# Patient Record
Sex: FEMALE | Race: WHITE | NOT HISPANIC OR LATINO | Employment: OTHER | URBAN - METROPOLITAN AREA
[De-identification: names, ages, dates, MRNs, and addresses within clinical notes are randomized per-mention and may not be internally consistent; named-entity substitution may affect disease eponyms.]

---

## 2021-07-01 ENCOUNTER — PATIENT MESSAGE (OUTPATIENT)
Dept: ADMINISTRATIVE | Facility: OTHER | Age: 36
End: 2021-07-01

## 2022-03-19 ENCOUNTER — TELEPHONE (OUTPATIENT)
Dept: GASTROENTEROLOGY | Facility: CLINIC | Age: 37
End: 2022-03-19
Payer: COMMERCIAL

## 2022-03-21 NOTE — TELEPHONE ENCOUNTER
Spoke with patient  She wants a 2nd opinion, but just started seeing a GI so she wants to wait  Advised we would keep her chart in our inactive files for 90 days if she changes her mind and wants an appointment  Pt verbalized understanding to all and has no further questions at this time.

## 2022-05-18 ENCOUNTER — TELEPHONE (OUTPATIENT)
Dept: GASTROENTEROLOGY | Facility: CLINIC | Age: 37
End: 2022-05-18
Payer: COMMERCIAL

## 2022-06-13 ENCOUNTER — TELEPHONE (OUTPATIENT)
Dept: GASTROENTEROLOGY | Facility: CLINIC | Age: 37
End: 2022-06-13
Payer: COMMERCIAL

## 2022-06-16 ENCOUNTER — PATIENT MESSAGE (OUTPATIENT)
Dept: GASTROENTEROLOGY | Facility: CLINIC | Age: 37
End: 2022-06-16
Payer: COMMERCIAL

## 2022-06-17 ENCOUNTER — PATIENT MESSAGE (OUTPATIENT)
Dept: GASTROENTEROLOGY | Facility: CLINIC | Age: 37
End: 2022-06-17
Payer: COMMERCIAL

## 2022-06-17 ENCOUNTER — TELEPHONE (OUTPATIENT)
Dept: GASTROENTEROLOGY | Facility: CLINIC | Age: 37
End: 2022-06-17
Payer: COMMERCIAL

## 2022-08-15 PROCEDURE — 99358 PROLONG SERVICE W/O CONTACT: CPT | Mod: S$GLB,,, | Performed by: INTERNAL MEDICINE

## 2022-08-15 PROCEDURE — 99358 PR PROLONGED SERV,NO CONTACT,1ST HR: ICD-10-PCS | Mod: S$GLB,,, | Performed by: INTERNAL MEDICINE

## 2022-08-16 ENCOUNTER — OFFICE VISIT (OUTPATIENT)
Dept: GASTROENTEROLOGY | Facility: CLINIC | Age: 37
End: 2022-08-16
Payer: COMMERCIAL

## 2022-08-16 VITALS
TEMPERATURE: 98 F | WEIGHT: 114.69 LBS | OXYGEN SATURATION: 100 % | SYSTOLIC BLOOD PRESSURE: 114 MMHG | DIASTOLIC BLOOD PRESSURE: 75 MMHG | BODY MASS INDEX: 21.65 KG/M2 | HEIGHT: 61 IN | HEART RATE: 80 BPM

## 2022-08-16 DIAGNOSIS — D84.9 IMMUNOSUPPRESSED STATUS: ICD-10-CM

## 2022-08-16 DIAGNOSIS — K51.218 ULCERATIVE PROCTITIS WITH OTHER COMPLICATION: Primary | ICD-10-CM

## 2022-08-16 PROCEDURE — 3078F DIAST BP <80 MM HG: CPT | Mod: CPTII,S$GLB,, | Performed by: INTERNAL MEDICINE

## 2022-08-16 PROCEDURE — 3074F PR MOST RECENT SYSTOLIC BLOOD PRESSURE < 130 MM HG: ICD-10-PCS | Mod: CPTII,S$GLB,, | Performed by: INTERNAL MEDICINE

## 2022-08-16 PROCEDURE — 3008F BODY MASS INDEX DOCD: CPT | Mod: CPTII,S$GLB,, | Performed by: INTERNAL MEDICINE

## 2022-08-16 PROCEDURE — 3078F PR MOST RECENT DIASTOLIC BLOOD PRESSURE < 80 MM HG: ICD-10-PCS | Mod: CPTII,S$GLB,, | Performed by: INTERNAL MEDICINE

## 2022-08-16 PROCEDURE — 99214 OFFICE O/P EST MOD 30 MIN: CPT | Mod: S$GLB,,, | Performed by: INTERNAL MEDICINE

## 2022-08-16 PROCEDURE — 3008F PR BODY MASS INDEX (BMI) DOCUMENTED: ICD-10-PCS | Mod: CPTII,S$GLB,, | Performed by: INTERNAL MEDICINE

## 2022-08-16 PROCEDURE — 99214 PR OFFICE/OUTPT VISIT, EST, LEVL IV, 30-39 MIN: ICD-10-PCS | Mod: S$GLB,,, | Performed by: INTERNAL MEDICINE

## 2022-08-16 PROCEDURE — 3074F SYST BP LT 130 MM HG: CPT | Mod: CPTII,S$GLB,, | Performed by: INTERNAL MEDICINE

## 2022-08-16 RX ORDER — LOPERAMIDE HCL 2 MG
2 TABLET ORAL 4 TIMES DAILY PRN
COMMUNITY
Start: 2022-05-17 | End: 2022-12-14

## 2022-08-16 RX ORDER — MESALAMINE 1000 MG/1
1000 SUPPOSITORY RECTAL 2 TIMES DAILY
COMMUNITY
Start: 2022-03-30 | End: 2022-08-20 | Stop reason: ALTCHOICE

## 2022-08-16 RX ORDER — USTEKINUMAB 90 MG/ML
90 INJECTION, SOLUTION SUBCUTANEOUS
COMMUNITY
Start: 2022-03-22 | End: 2022-11-10 | Stop reason: DRUGHIGH

## 2022-08-16 RX ORDER — MULTIVITAMIN
1 TABLET ORAL DAILY
COMMUNITY
End: 2022-12-14 | Stop reason: SDUPTHER

## 2022-08-16 RX ORDER — MESALAMINE 1.2 G/1
4.8 TABLET, DELAYED RELEASE ORAL
COMMUNITY
Start: 2022-07-01 | End: 2023-03-20 | Stop reason: SDUPTHER

## 2022-08-16 RX ORDER — LAMOTRIGINE 150 MG/1
150 TABLET ORAL DAILY
COMMUNITY
Start: 2022-01-18

## 2022-08-16 NOTE — PROGRESS NOTES
"IBD PATIENT INTAKE:    COVID symptoms in the last 7 days (runny nose, sore throat, congestion, cough, fever): yes, + covid 1st week of July   PCP: TESSA MCCOY  If not PCP-  number given to establish [phone]: N/A    ALLERGIES REVIEWED:  Yes    CHIEF COMPLAINT:    Chief Complaint   Patient presents with    Ulcerative Colitis       VITAL SIGNS:  /75 (BP Location: Left arm, Patient Position: Sitting)   Pulse 80   Temp 98.1 °F (36.7 °C)   Ht 5' 1" (1.549 m)   Wt 52 kg (114 lb 11.2 oz)   SpO2 100%   BMI 21.67 kg/m²      Change in medical, surgical, family or social history: No    IBD THERAPY (name, dose/frequency):  Stelara Q 8 weeks, lialda 4.8 g daily and canasa supp BID (pt do not take supp daily. Last took on Sat 8/13/22 1 supp daily)  Last dose:  7/12/22    Next dose:  9/6/22  Infusion/Pharmacy: [facility]    NSAIDs (aspirin, ibuprofen-advil or motrin, naproxen-aleve, diclofenac-voltaren, BC powder, excedrin, goodies): No    Alternative/Complementary Medications (i.e. probiotics, turmeric, fish oil, aloe vera):      no  Name/dose:  None     Vitamins:   Vit D:  no     Vit B-12:  no   Folic Acid: no       Calcium: no     Iron:  no      MVI: yes, daily     Antibiotics (past 30 Days):  no  If yes   Indication:  Name of antibiotic:  Completion date:     REVIEWED MEDICATION LIST RECONCILED INCLUDING ABOVE MEDS:  yes                      Answers for HPI/ROS submitted by the patient on 8/10/2022  cough: Yes  Feeling depressed?: Yes  nervous/ anxious: Yes  back pain: Yes      "

## 2022-08-16 NOTE — PROGRESS NOTES
[facility]          Inflammatory Bowel Disease          New Patient Note         TODAY'S VISIT DATE:  8/20/2022    Reason for Consult:    Chief Complaint   Patient presents with    Ulcerative Colitis     PCP: TESSA MCCOY      Referring MD:   Rosalinda Ott MD  [address] [place] [id number]     History of Present Illness:    Dear Dr. Ott,     Thank you for referring Ms Jewell to the Inflammatory Bowel Disease clinic at [facility] for her ulcerative colitis. I had the pleasure of seeing her on 8/16/22   As you know, her past medical history includes IBS, family history of celiac disease. In 2000 she had constipation and underwent barium enema and after this rectal bleeding started.  In approximately 2001 she had a colonoscopy in [facility] and she was diagnosed with ulcerative proctitis and treated with proctofoam 1-2 mos and this helped. From 1333-2024 she had intermittent constipation and blood in stool and treated this with miralax. In 12/2004 she started with rectal bleeding and colonoscopy showed distal 10 cm of rectum with granularity and friability and multiple erosions with remainder of colon and TI normal.  She was diagnosed with ulcerative proctitis and treated with asacol 400 mg BID which was optimized to 6.4 g/d but the number of pills/frequency was difficult at her age and she was only taking about 1/2 the dose recommended. She moved to [facility] from [place] in 2008 and from 0549-4867 and when she met Dr. Alston due to difficult taking medicine asacol was discontinued and she was started on lialda 2.4 g/d. When she was on asacol from 7910-7740 symptoms would improve with flares a few times/year (constipation and rectal bleeding) and these were treated with 2 courses of prednisone, rowasa enemas and canasa suppositories (not compliant but it helped when she took it). At times she was also given miralax for constipation sx that seemed to precipitate  the rectal bleeding.  While on lialda 2.4 g/day she was compliant with medicines though continued with flares 2-3 times/year treated miralax and had about once a year would take short course of prednisone. Patient delivered a healthy baby girl by  13 and during pregnancy she felt well and was compliant lialda 2.4 g/d.  She saw Dr. Alston  and reported abd pain that improved with defecation, 3-4 formed BMs/d, daily reflux, postprandial bloating.  She was prescribed librax and colonoscopy 9/21/15 showed normal colon with localized continuous erythema with mild granularity and erosions in the rectum to 10 cm. From 9876-7507 she continued on lialda and briefly due to insurance issues in early  she took apriso for a few mos and went back to lialda at higher dose of 4.8 g/d in early summer 2017. In 2017 pt then established with Dr. Ott at with time she reported 20 small volumed stool/d with crampy abd pain on apriso 1.5 g/d and rowasa enemas added to her treatment. Flex sig 17 significant for diffuse granularity, friability, patchy ulcerations with loss of normal vascularity cw UC Jenkins score 2-3 and biopsies of rectum c/w chronic active proctitis with crypt abscesses. She took a short course of prednisone and transitioned to lialda 4.8 g/d and continued on rowasa qhs (compliant with 2 weeks). The combination of prednisone, lialda and rowasa enemas and she feels that the prednisone helped her retain the enemas.  In 2017 pt had some intermittent bleeding and patient asked to decrease lialda 2.4 g/d and rowasa enemas qhs.  Flex sig 18 significant for right at the anal verge in the most distal 1-2 cm faint granularity with mild loss of vascularity c/w Jenkins 1 disease. From 2913-6592 she was on intermittent courses rowasa enemas for 2 weeks and this would help when able to hold it in. By 10/2018 she reported if she stopped or skipped dose of rowasa enemas her symptoms would return and she  continued on lialda 2.4 g/d and she was advised at this visit to start canasa suppositories qhs, decrease rowasa enemas to once a week, continue lialda 2.4 g/d. Due to ongoing symptoms she started Humira 2018 with good response and pt wanted to stop lialda by early . In 2020 due to dyspepsia she was placed on PPI and underwent US 10/2020 for RUQ abd pain which was normal with normal GB EF. She then continued on monotherapy with humira 40 mg SC q 2 weeks.  In approximately 2021 pt developed rectal bleeding and started on prednisone course.  On 12/3/21 pt had trough humira levels 15.6/Abs neg. Flex sig 22 showed circumferential erythema, grnularrity with loss of vascularity and focal punctate ulcers starting 10 cm from anus with biopsies of rectum c/w CMV neg moderately active proctitis and rectosigmoid colon bx normal. In 2021 she restarted lialda 4.8 g/d (good adherent). Due to continued symptoms Dr. Ott attempted to get weekly humira approved though this was denied by her insurance so on 3/22/22 she started on stelara with prednisone taper (stopped in 2022). Currently patient reports about 40 trips to the toilet of which 35 of those trips (2 trips per hour) are during the day and 5 trips nocturnal b/w hrs of 9pm-5am. Of the total trips to the toilet about 50% are soft to skinny formed stool and remaining are false trips with mucus and blood on toilet paper. Pt has cough, back pain, anxiety/depression. She continues on stelara 90 mg SC q 8 weeks (LD , ND ), lialda 4.8 g/d, canasa supp twice a week (urgency and so difficult to take supp).     Prior Pertinent Surgeries:   None    Pertinent Endoscopy/Imagin/2004 colonoscopy:  distal 10 cm of rectum with granularity and friability and multiple erosions with remainder of colon and TI normal  9/21/15 colonoscopy: normal colon with localized continuous erythema with mild granularity and erosions in the rectum to 10 cm  17 flex  sig: diffuse granularity, friability, patchy ulcerations with loss of normal vascularity cw UC Jenkins score 2-3 and biopsies of rectum c/w chronic active proctitis with crypt abscesses.   5/11/18 flex sig: right at the anal verge in the most distal 1-2 cm faint granularity with mild loss of vascularity c/w Jenkins 1 disease.   10/2020 US: normal with normal GB EF.  1/31/22 flex sig: circumferential erythema, granularity with loss of vascularity and focal punctate ulcers starting 10 cm from anus with biopsies of rectum c/w CMV neg moderately active proctitis and rectosigmoid colon bx normal.    Therapeutic Drug Monitoring Labs:  12/3/21 trough humira levels 15.6/Abs neg    Prior IBD Therapies:  Lialda  Rowasa enemas- effective with combination of lialda, rowasa   Canasa suppositories- possibly effective in the past  Prednisone- effective in past  Humira 40 mg SC q 2 weeks (12/2018-approximately 2/2022)- secondary nonresponder with good levels/neg abs, weekly humira denied though good drug levels    Vaccinations:  No results found for: HEPBSAB  Lab Results   Component Value Date    HEPBSURFABQU POSITIVE 08/16/2022    HEPBSURFABQU >1,000 08/16/2022     Lab Results   Component Value Date    HEPAIGG Negative 08/16/2022     Lab Results   Component Value Date    VARICELLAZOS 2.98 (H) 08/16/2022    VARICELLAINT Positive (A) 08/16/2022       There is no immunization history on file for this patient.  Flu shot: recommended this fall  COVID vaccine/booster:  Recommended   PCV 20: recommended  Tetanus (TdaP): recommended every 10 years  HPV: NA   Meningococcal: NA  Hepatitis B: will check immunity     Hepatitis A:  will check immunity     MMR (live vaccine): will check immunity          Chickenpox status/Varicella (live vaccine):  will check immunity     Shingrix: recommended     Review of Systems   Constitutional: Negative for chills, fever and weight loss.   HENT:        No oral ulcers, dysphagia, oral thrush   Eyes: Negative for  blurred vision, pain and redness.   Respiratory: Positive for cough. Negative for shortness of breath.    Cardiovascular: Negative for chest pain.   Gastrointestinal: Negative for abdominal pain, heartburn, nausea and vomiting.   Genitourinary: Negative for dysuria and hematuria.   Musculoskeletal: Positive for back pain. Negative for joint pain.   Skin: Negative for rash.   Psychiatric/Behavioral: Positive for depression. The patient is nervous/anxious. The patient does not have insomnia.      Medical/Surgical History:    has a past medical history of GERD (gastroesophageal reflux disease), Irritable bowel syndrome, Mental disorder, and Ulcerative colitis.    has a past surgical history that includes  section; Cholecystectomy; TONSILLECTOMY, ADENOIDECTOMY; and [place] tooth extraction.     Family History:   family history includes Heart attack in her father; Hyperlipidemia in her mother; Hypertension in her father and mother; Liver cancer in her maternal grandfather.     Social History:    reports that she has never smoked. She has never used smokeless tobacco. She reports current alcohol use. She reports that she does not use drugs.     Review of patient's allergies indicates:  No Known Allergies    Current Medications:   Outpatient Medications Marked as Taking for the 22 encounter (Office Visit) with Zana Saldana MD   Medication Sig Dispense Refill    buPROPion (WELLBUTRIN XL) 150 MG TB24 tablet       desog-e.estradioL/e.estradioL (VIORELE, 28,) 0.15-0.02 mgx21 /0.01 mg x 5 per tablet Take 1 tablet by mouth once daily. 84 tablet 0    lamoTRIgine (LAMICTAL) 150 MG Tab Take 150 mg by mouth once daily.      loperamide (IMODIUM A-D) 2 mg Tab Take 2 mg by mouth 4 (four) times daily as needed.      mesalamine (LIALDA) 1.2 gram TbEC Take 4.8 g by mouth.      multivitamin (ONE DAILY MULTIVITAMIN) per tablet Take 1 tablet by mouth once daily.      ustekinumab (STELARA) 90 mg/mL Syrg syringe Inject  "90 mg into the skin every 8 weeks.      [DISCONTINUED] mesalamine (CANASA) 1000 MG Supp 1,000 mg 2 (two) times daily.        Vital Signs:  /75 (BP Location: Left arm, Patient Position: Sitting)   Pulse 80   Temp 98.1 °F (36.7 °C)   Ht 5' 1" (1.549 m)   Wt 52 kg (114 lb 11.2 oz)   SpO2 100%   BMI 21.67 kg/m²      Physical Exam  Constitutional:       General: She is not in acute distress.  Cardiovascular:      Rate and Rhythm: Normal rate and regular rhythm.      Pulses: Normal pulses.      Heart sounds: Normal heart sounds. No murmur heard.  Pulmonary:      Effort: Pulmonary effort is normal.      Breath sounds: Normal breath sounds.   Abdominal:      General: Bowel sounds are normal. There is no distension.      Palpations: Abdomen is soft.      Tenderness: There is no abdominal tenderness.     Labs: Reviewed  Lab Results   Component Value Date    CRP 2.4 08/16/2022     Lab Results   Component Value Date    HEPBSAG Negative 08/16/2022    HEPBCAB Negative 08/16/2022     Lab Results   Component Value Date    TBGOLDPLUS Negative 08/16/2022     Lab Results   Component Value Date    CLNCOXII01YN 40 08/16/2022    VDPFQFPN46 459 08/16/2022     Lab Results   Component Value Date    WBC 6.81 08/16/2022    HGB 12.5 08/16/2022    HCT 36.5 (L) 08/16/2022    MCV 93 08/16/2022     08/16/2022     Lab Results   Component Value Date    CREATININE 0.8 08/16/2022    ALBUMIN 4.0 08/16/2022    BILITOT 0.4 08/16/2022    ALKPHOS 64 08/16/2022    AST 16 08/16/2022    ALT 10 08/16/2022       Assessment/Plan:  Lucía Jewell is a 37 y.o. female with ulcerative colitis (proctitis) who is currently on stelara 90 mg SC q 8 weeks (started 3/22/22, LD 7/12, ND 9/6), lialda 4.8 g/d, canasa supp twice a week (urgency and so difficult to take supp).  Today I had a long discussion with the patient regarding her diagnosis and what medicines are best for proctitis. She has had more systemic treatment for this given she cannot hold " in the topical therapy and has responded in past to prednisone but not sustained response with last course of prednisone and has been on stelara since 3/2022.  She had initial good response to humira but then lost response for unclear reasons with good drug levels/neg Abs.  Today she also does not report only tenesmus symptoms but frequent passage of small stool so I want to make sure there is not another cause for her symptoms such as celiac disease (father with celiac), infection (will get stool studies) but also may be component of IBS.  We discussed my overall approach to treating proctitis and I would like to get stool studies and if neg for infection start her on antidiarrheals to help her hold in canasa BID with reassessment in 2 weeks and low threshold to scope and make sure disease has not extended past the rectum.  If canasa BID does not work but she is able to hold supp in then we may consider alternative suppositories such as hydrocortisone with canasa or tacrolimus supp with canasa.      # Diarrhea  - stool studies- cultures, C diff, calprotectin  - if neg stool studies for infection then will start imodium and if ineffective lomotil to allow her to get some relief and also hold in suppositories  - other workup for diarrhea- celiac panel (dad with celiac disease)  - may have IBS component though discussed with pt this is a dx of exclusion    # Ulcerative colitis (proctitis)    - I had a long discussion with patient regarding epidemiology, potential etiologies, associations and triggers (avoiding NSAIDS, using antibiotics with caution,stress and smoking effects on disease state), diagnosis, management goals and treatment options   - diarrhea- stool cultures, C diff or calprotectin  - if neg stool studies for infection then will start imodium and if ineffective lomotil to allow her to get some relief and also hold in suppositories  - start canasa suppositories BID (10 am after AM BMS, 9 pm before bedtime)  and if no better then will consider colonoscopy to be sure no disease extension  - continue lialda 4.8 g/d  - continue stelara 90 mg SC q 8 weeks for now  - discussed stelara in detail including MOA, risks, route/dose and overall plans,  - RN to check with patient weekly to assess response and determined next steps with MD  - pt advised to avoid all NSAIDs (Advil, Ibuprofen, Motrin, Aspirin, Naprosyn, Aleve)  - pt told to use antibiotics with caution and only take if necessary and will inform us of any infections or need for antibiotics   - pt advised to avoid raw seafood (such as raw oysters or raw sushi)  - stool calprotectin- now  - smoking status: never smoked   - basic labs: CBC, CMP, CRP, HCV Ab, HIV, vitamin B12, TSH, TTG IgA/total serum IgA  - drug monitoring labs: TPMT, TB quantiferon, Hep B testing (HBsAg, HBtotalcoreAb)    # IBD specific health maintenance:  CRC risk: average risk, proctitis  Skin exam: use sunblock/hats/sunprotective clothing- yearly  Risk for osteopenia/osteoporosis- none  Pap smear- yearly  Vitamin D- will check vit D today, takes MVI daily  Vaccines: will check immunity to hep A, B, varicella and MMR    Follow up: 2 mos    Thank you again for sending Lucía Jewell to see Dr. Zana Saldana today at the [facility]. Please don't hesitate to contact Dr. Saldana if there are any questions regarding this evaluation, or if you have any other patients with inflammatory bowel disease for whom you would like a consultation. You can reach Dr. Saldana at [phone] or by email at [email or web address]    Zana Saldana MD  Department of Gastroenterology  Medical Director, Inflammatory Bowel Disease

## 2022-08-16 NOTE — PATIENT INSTRUCTIONS
Instructions:  - labs today  - turn in stool studies today or later this week at our lab please  - colonoscopy timing to be determined  - RN to check with pt weekly to see how canasa BID is going  - start canasa twice a day 10 am and 9pm  - if stool studies neg for infection then take imodium  - continue lialda  - continue stelara  - Avoid all NSAIDs (Advil, Ibuprofen, Motrin, Aspirin, Naprosyn, Aleve)  - Yearly Pap Smears recommended if immunosuppressed   - Yearly Skin exam--wear sun block and hats  - Use antibiotics with caution and only take if necessary, please inform us of any infections or need for antibiotics   - No live vaccines if your are immunosuppressed   - Please avoid raw seafood (such as raw oysters or raw sushi) if you are immunosuppressed

## 2022-08-18 ENCOUNTER — PATIENT MESSAGE (OUTPATIENT)
Dept: GASTROENTEROLOGY | Facility: CLINIC | Age: 37
End: 2022-08-18
Payer: COMMERCIAL

## 2022-08-18 ENCOUNTER — LAB VISIT (OUTPATIENT)
Dept: LAB | Facility: HOSPITAL | Age: 37
End: 2022-08-18
Attending: INTERNAL MEDICINE
Payer: COMMERCIAL

## 2022-08-18 DIAGNOSIS — K51.218 ULCERATIVE PROCTITIS WITH OTHER COMPLICATION: ICD-10-CM

## 2022-08-18 DIAGNOSIS — D84.9 IMMUNOSUPPRESSED STATUS: ICD-10-CM

## 2022-08-18 PROCEDURE — 87045 FECES CULTURE AEROBIC BACT: CPT | Performed by: INTERNAL MEDICINE

## 2022-08-18 PROCEDURE — 87046 STOOL CULTR AEROBIC BACT EA: CPT | Performed by: INTERNAL MEDICINE

## 2022-08-18 PROCEDURE — 83993 ASSAY FOR CALPROTECTIN FECAL: CPT | Performed by: INTERNAL MEDICINE

## 2022-08-18 PROCEDURE — 87427 SHIGA-LIKE TOXIN AG IA: CPT | Mod: 59 | Performed by: INTERNAL MEDICINE

## 2022-08-18 PROCEDURE — 87449 NOS EACH ORGANISM AG IA: CPT | Performed by: INTERNAL MEDICINE

## 2022-08-19 LAB
C DIFF GDH STL QL: NEGATIVE
C DIFF TOX A+B STL QL IA: NEGATIVE
E COLI SXT1 STL QL IA: NEGATIVE
E COLI SXT2 STL QL IA: NEGATIVE

## 2022-08-20 PROBLEM — D84.9 IMMUNOSUPPRESSED STATUS: Status: ACTIVE | Noted: 2022-08-20

## 2022-08-20 PROBLEM — K51.20 ULCERATIVE PROCTITIS: Status: ACTIVE | Noted: 2022-08-20

## 2022-08-20 RX ORDER — MESALAMINE 1000 MG/1
1000 SUPPOSITORY RECTAL 2 TIMES DAILY
Qty: 60 SUPPOSITORY | Refills: 1 | Status: SHIPPED | OUTPATIENT
Start: 2022-08-20 | End: 2022-09-19

## 2022-08-20 NOTE — PROGRESS NOTES
I spent 30 minutes on  8/15/22 reviewing [facility] medical records prior to clinic visit on 8/16/22.

## 2022-08-22 LAB — BACTERIA STL CULT: NORMAL

## 2022-08-23 ENCOUNTER — TELEPHONE (OUTPATIENT)
Dept: GASTROENTEROLOGY | Facility: CLINIC | Age: 37
End: 2022-08-23
Payer: COMMERCIAL

## 2022-08-23 NOTE — TELEPHONE ENCOUNTER
"- Current IBD meds: stelara q 8 weeks (LD 7/12, ND 9/6), lialda 4.8 g/d, canasa BID (taking consistently x 1 week w/ no missed doses; pt able to hold in)  - Overall pt doing "really good"  - Reports 5 trips to the toilet of which 0-1 consists of liquid-soft, skinny stools; remaining trips consist of urgency & gas  - Denies nocturnal BMs & blood  - Has not started Imodium  - Continues w/ low residue diet  - Will update Dr. Saldana  "

## 2022-08-23 NOTE — TELEPHONE ENCOUNTER
Called & spoke to pt  - Informed of plan to continue canasa BID  - Once tenesmus symptoms resolved then plan for repeat calprotectin at that time to determine taper instructions  - Pt expressed understanding

## 2022-08-24 ENCOUNTER — TELEPHONE (OUTPATIENT)
Dept: GASTROENTEROLOGY | Facility: CLINIC | Age: 37
End: 2022-08-24
Payer: COMMERCIAL

## 2022-08-24 LAB — CALPROTECTIN STL-MCNT: <27.1 MCG/G

## 2022-08-24 NOTE — TELEPHONE ENCOUNTER
Called & spoke to pt  - reviewed normal stool calprotectin   - Instructed to continue Canasa BID w/ an update next week  - Plan of care TBD at that time  - Pt expressed understanding

## 2022-08-29 ENCOUNTER — TELEPHONE (OUTPATIENT)
Dept: GASTROENTEROLOGY | Facility: CLINIC | Age: 37
End: 2022-08-29
Payer: COMMERCIAL

## 2022-08-29 NOTE — TELEPHONE ENCOUNTER
Copy of Dr Saldana's office note was faxed to Dr Ott at [phone] on 8/22/22      Called and spoke to Lindy   Faxed was received

## 2022-08-29 NOTE — TELEPHONE ENCOUNTER
----- Message from Zana Saldana MD sent at 8/20/2022 11:29 AM CDT -----  Marcella  Please make sure Dr. Ott in [facility] get my note and confirm receipt with her staff    SS

## 2022-08-31 ENCOUNTER — TELEPHONE (OUTPATIENT)
Dept: GASTROENTEROLOGY | Facility: CLINIC | Age: 37
End: 2022-08-31
Payer: COMMERCIAL

## 2022-08-31 ENCOUNTER — PATIENT MESSAGE (OUTPATIENT)
Dept: GASTROENTEROLOGY | Facility: CLINIC | Age: 37
End: 2022-08-31
Payer: COMMERCIAL

## 2022-09-01 ENCOUNTER — PATIENT MESSAGE (OUTPATIENT)
Dept: GASTROENTEROLOGY | Facility: CLINIC | Age: 37
End: 2022-09-01
Payer: COMMERCIAL

## 2022-09-01 NOTE — TELEPHONE ENCOUNTER
- Current IBD meds: stelara q 8 weeks (LD 7/12, ND 9/6), lialda 4.8 g/d, canasa BID (did not use suppositories on 8/31/22)  - Reports 2-3 trips to the bathroom of which 1 consists of urgency & Canasa residue; otherwise reports loose stools w/ urgency  - Reports improved QOL since using Canasa BID  - Will update Dr. Saldana

## 2022-09-02 ENCOUNTER — PATIENT MESSAGE (OUTPATIENT)
Dept: GASTROENTEROLOGY | Facility: CLINIC | Age: 37
End: 2022-09-02
Payer: COMMERCIAL

## 2022-09-09 ENCOUNTER — TELEPHONE (OUTPATIENT)
Dept: GASTROENTEROLOGY | Facility: CLINIC | Age: 37
End: 2022-09-09
Payer: COMMERCIAL

## 2022-09-09 DIAGNOSIS — K51.218 ULCERATIVE PROCTITIS WITH OTHER COMPLICATION: Primary | ICD-10-CM

## 2022-09-09 NOTE — TELEPHONE ENCOUNTER
- Current IBD meds: stelara q 8 weeks (LD 9/6/22, ND 11/2/22), lialda 4.8 g/d, canasa BID (has missed ~ 2 doses since 9/1/22)  - Reports 2 liquid BM/ QD w/ no tenesmus symptoms  - Complaints of abdominal cramping & urgency only after consuming caffeine  - Instructed to submit stool calprotectin at [facility] then will discuss Canasa taper instructions  - Pt expressed understanding

## 2022-09-12 ENCOUNTER — PATIENT MESSAGE (OUTPATIENT)
Dept: GASTROENTEROLOGY | Facility: CLINIC | Age: 37
End: 2022-09-12
Payer: COMMERCIAL

## 2022-09-17 LAB
CALPROTECTIN STL-MCNT: 273 UG/G (ref 0–120)
SPECIMEN STATUS REPORT: NORMAL

## 2022-09-19 ENCOUNTER — TELEPHONE (OUTPATIENT)
Dept: GASTROENTEROLOGY | Facility: CLINIC | Age: 37
End: 2022-09-19
Payer: COMMERCIAL

## 2022-09-19 DIAGNOSIS — K51.218 ULCERATIVE PROCTITIS WITH OTHER COMPLICATION: Primary | ICD-10-CM

## 2022-09-19 NOTE — TELEPHONE ENCOUNTER
Called & spoke to pt  - Informed of plan for ASAP colonoscopy  - Pt expressed understanding  - All questions answered  - Tx'd to endo scheduling [phone]

## 2022-09-19 NOTE — TELEPHONE ENCOUNTER
----- Message from Zana Saldana MD sent at 9/19/2022 12:37 PM CDT -----  Stool calprotectin increased though normal about a month ago  Reasses symptoms and meds and lets discuss

## 2022-09-19 NOTE — TELEPHONE ENCOUNTER
- Current IBD meds: stelara q 8 weeks (LD 9/6/22, ND 11/2/22), lialda 4.8 g/d, canasa BID (increased to BID 8/23/22; misses ~2 AM doses/ week)  - Reports 2 bad days since we last spoke 10 days ago  - Bad days consist of 40 loose BM mostly occurring in the AM w/ tenesmus   - Otherwise reports 2 loose BM/ QD w/ no tenesmus  - Will discuss plan of care w/ Dr. Saldana

## 2022-09-20 ENCOUNTER — PATIENT MESSAGE (OUTPATIENT)
Dept: ENDOSCOPY | Facility: HOSPITAL | Age: 37
End: 2022-09-20
Payer: COMMERCIAL

## 2022-09-20 ENCOUNTER — PATIENT MESSAGE (OUTPATIENT)
Dept: GASTROENTEROLOGY | Facility: CLINIC | Age: 37
End: 2022-09-20
Payer: COMMERCIAL

## 2022-09-20 DIAGNOSIS — K51.218 ULCERATIVE PROCTITIS WITH OTHER COMPLICATION: Primary | ICD-10-CM

## 2022-09-20 LAB — SPECIMEN STATUS REPORT: NORMAL

## 2022-09-22 ENCOUNTER — TELEPHONE (OUTPATIENT)
Dept: GASTROENTEROLOGY | Facility: CLINIC | Age: 37
End: 2022-09-22
Payer: COMMERCIAL

## 2022-09-22 ENCOUNTER — PATIENT MESSAGE (OUTPATIENT)
Dept: GASTROENTEROLOGY | Facility: CLINIC | Age: 37
End: 2022-09-22
Payer: COMMERCIAL

## 2022-09-29 ENCOUNTER — ANESTHESIA EVENT (OUTPATIENT)
Dept: ENDOSCOPY | Facility: HOSPITAL | Age: 37
End: 2022-09-29
Payer: COMMERCIAL

## 2022-09-29 ENCOUNTER — ANESTHESIA (OUTPATIENT)
Dept: ENDOSCOPY | Facility: HOSPITAL | Age: 37
End: 2022-09-29
Payer: COMMERCIAL

## 2022-09-29 ENCOUNTER — HOSPITAL ENCOUNTER (OUTPATIENT)
Facility: HOSPITAL | Age: 37
Discharge: HOME | End: 2022-09-29
Attending: INTERNAL MEDICINE | Admitting: INTERNAL MEDICINE
Payer: COMMERCIAL

## 2022-09-29 VITALS
OXYGEN SATURATION: 100 % | TEMPERATURE: 98 F | RESPIRATION RATE: 16 BRPM | WEIGHT: 114 LBS | BODY MASS INDEX: 21.52 KG/M2 | DIASTOLIC BLOOD PRESSURE: 82 MMHG | HEIGHT: 61 IN | HEART RATE: 73 BPM | SYSTOLIC BLOOD PRESSURE: 127 MMHG

## 2022-09-29 DIAGNOSIS — D84.9 IMMUNOSUPPRESSED STATUS: ICD-10-CM

## 2022-09-29 DIAGNOSIS — K51.90 ULCERATIVE COLITIS: ICD-10-CM

## 2022-09-29 DIAGNOSIS — K51.218 ULCERATIVE PROCTITIS WITH OTHER COMPLICATION: Primary | ICD-10-CM

## 2022-09-29 LAB
B-HCG UR QL: NEGATIVE
CTP QC/QA: YES

## 2022-09-29 PROCEDURE — 88342 IMHCHEM/IMCYTCHM 1ST ANTB: CPT | Mod: 59 | Performed by: PATHOLOGY

## 2022-09-29 PROCEDURE — 88342 IMHCHEM/IMCYTCHM 1ST ANTB: CPT | Mod: 26,,, | Performed by: PATHOLOGY

## 2022-09-29 PROCEDURE — 88305 TISSUE EXAM BY PATHOLOGIST: CPT | Mod: 26,,, | Performed by: PATHOLOGY

## 2022-09-29 PROCEDURE — 45380 PR COLONOSCOPY,BIOPSY: ICD-10-PCS | Mod: ,,, | Performed by: INTERNAL MEDICINE

## 2022-09-29 PROCEDURE — 37000009 HC ANESTHESIA EA ADD 15 MINS: Performed by: INTERNAL MEDICINE

## 2022-09-29 PROCEDURE — 63600175 PHARM REV CODE 636 W HCPCS: Performed by: NURSE ANESTHETIST, CERTIFIED REGISTERED

## 2022-09-29 PROCEDURE — 81025 URINE PREGNANCY TEST: CPT | Performed by: INTERNAL MEDICINE

## 2022-09-29 PROCEDURE — 45380 COLONOSCOPY AND BIOPSY: CPT | Mod: ,,, | Performed by: INTERNAL MEDICINE

## 2022-09-29 PROCEDURE — E9220 PRA ENDO ANESTHESIA: HCPCS | Mod: ,,, | Performed by: NURSE ANESTHETIST, CERTIFIED REGISTERED

## 2022-09-29 PROCEDURE — 45380 COLONOSCOPY AND BIOPSY: CPT | Performed by: INTERNAL MEDICINE

## 2022-09-29 PROCEDURE — 27201012 HC FORCEPS, HOT/COLD, DISP: Performed by: INTERNAL MEDICINE

## 2022-09-29 PROCEDURE — E9220 PRA ENDO ANESTHESIA: ICD-10-PCS | Mod: ,,, | Performed by: NURSE ANESTHETIST, CERTIFIED REGISTERED

## 2022-09-29 PROCEDURE — 25000003 PHARM REV CODE 250: Performed by: INTERNAL MEDICINE

## 2022-09-29 PROCEDURE — 88342 CHG IMMUNOCYTOCHEMISTRY: ICD-10-PCS | Mod: 26,,, | Performed by: PATHOLOGY

## 2022-09-29 PROCEDURE — 88305 TISSUE EXAM BY PATHOLOGIST: CPT | Mod: 59 | Performed by: PATHOLOGY

## 2022-09-29 PROCEDURE — 25000003 PHARM REV CODE 250: Performed by: NURSE ANESTHETIST, CERTIFIED REGISTERED

## 2022-09-29 PROCEDURE — 88305 TISSUE EXAM BY PATHOLOGIST: ICD-10-PCS | Mod: 26,,, | Performed by: PATHOLOGY

## 2022-09-29 PROCEDURE — 37000008 HC ANESTHESIA 1ST 15 MINUTES: Performed by: INTERNAL MEDICINE

## 2022-09-29 RX ORDER — PROPOFOL 10 MG/ML
VIAL (ML) INTRAVENOUS
Status: DISCONTINUED | OUTPATIENT
Start: 2022-09-29 | End: 2022-09-29

## 2022-09-29 RX ORDER — PROPOFOL 10 MG/ML
VIAL (ML) INTRAVENOUS CONTINUOUS PRN
Status: DISCONTINUED | OUTPATIENT
Start: 2022-09-29 | End: 2022-09-29

## 2022-09-29 RX ORDER — LIDOCAINE HYDROCHLORIDE 20 MG/ML
INJECTION, SOLUTION EPIDURAL; INFILTRATION; INTRACAUDAL; PERINEURAL
Status: DISCONTINUED | OUTPATIENT
Start: 2022-09-29 | End: 2022-09-29

## 2022-09-29 RX ORDER — SODIUM CHLORIDE 9 MG/ML
INJECTION, SOLUTION INTRAVENOUS CONTINUOUS
Status: DISCONTINUED | OUTPATIENT
Start: 2022-09-29 | End: 2022-09-29 | Stop reason: HOSPADM

## 2022-09-29 RX ADMIN — PROPOFOL 100 MG: 10 INJECTION, EMULSION INTRAVENOUS at 12:09

## 2022-09-29 RX ADMIN — SODIUM CHLORIDE: 0.9 INJECTION, SOLUTION INTRAVENOUS at 12:09

## 2022-09-29 RX ADMIN — Medication 150 MCG/KG/MIN: at 12:09

## 2022-09-29 RX ADMIN — LIDOCAINE HYDROCHLORIDE 40 MG: 20 INJECTION, SOLUTION EPIDURAL; INFILTRATION; INTRACAUDAL at 12:09

## 2022-09-29 NOTE — ANESTHESIA RELEASE NOTE
"Anesthesia Release from PACU Note    Patient: Lucía Jewell    Procedure(s) Performed: Procedure(s) (LRB):  COLONOSCOPY (N/A)    Anesthesia type: general    Post pain: Adequate analgesia    Post assessment: no apparent anesthetic complications and tolerated procedure well    Last Vitals:   Visit Vitals  /82 (BP Location: Left arm, Patient Position: Lying)   Pulse 73   Temp 36.6 °C (97.8 °F) (Temporal)   Resp 16   Ht 5' 1" (1.549 m)   Wt 51.7 kg (114 lb)   SpO2 100%   Breastfeeding No   BMI 21.54 kg/m²       Post vital signs: stable    Level of consciousness: awake and alert     Nausea/Vomiting: no nausea/no vomiting    Complications: none    Airway Patency: patent    Respiratory: unassisted, spontaneous ventilation, room air    Cardiovascular: stable and blood pressure at baseline    Hydration: euvolemic  "

## 2022-09-29 NOTE — H&P
Short Stay Endoscopy History and Physical    PCP - TESSA MCCOY NP  Referring Physician - Zana Saldana MD  [address] [place] [address]    Procedure - Colonoscopy  ASA - per anesthesia  Mallampati - per anesthesia  History of Anesthesia problems - no  Family history Anesthesia problems -  no   Plan of anesthesia - General    HPI  37 y.o. female  Reason for procedure: ulcerative colitis f/u      ROS:  Constitutional: No fevers, chills, No weight loss  CV: No chest pain  Pulm: No cough, No shortness of breath  GI: see HPI    Medical History:  has a past medical history of Immunosuppressed status (2022), Irritable bowel syndrome, Mental disorder, and Ulcerative proctitis (2022).    Surgical History:  has a past surgical history that includes  section; Cholecystectomy; TONSILLECTOMY, ADENOIDECTOMY; and [place] tooth extraction.    Family History: family history includes Heart attack in her father; Hyperlipidemia in her mother; Hypertension in her father and mother; Liver cancer in her maternal grandfather..    Social History:  reports that she has never smoked. She has never used smokeless tobacco. She reports that she does not currently use alcohol. She reports that she does not use drugs.    Review of patient's allergies indicates:  No Known Allergies    Medications:   Medications Prior to Admission   Medication Sig Dispense Refill Last Dose    buPROPion (WELLBUTRIN XL) 150 MG TB24 tablet    2022    desog-e.estradioL/e.estradioL (VIORELE, 28,) 0.15-0.02 mgx21 /0.01 mg x 5 per tablet Take 1 tablet by mouth once daily. 84 tablet 0 2022    lamoTRIgine (LAMICTAL) 150 MG Tab Take 150 mg by mouth once daily.   2022    mesalamine (LIALDA) 1.2 gram TbEC Take 4.8 g by mouth.   2022    loperamide (IMODIUM A-D) 2 mg Tab Take 2 mg by mouth 4 (four) times daily as needed.       multivitamin (ONE DAILY MULTIVITAMIN) per tablet Take 1 tablet by mouth once daily.        ustekinumab (STELARA) 90 mg/mL Syrg syringe Inject 90 mg into the skin every 8 weeks.   9/6/2022       Physical Exam:    Vital Signs:   Vitals:    09/29/22 1218   BP: 102/67   Pulse: 77   Resp: 16   Temp: 97.7 °F (36.5 °C)       General Appearance: Well appearing in no acute distress  Abdomen: Soft, non tender, non distended with normal bowel sounds, no masses    Labs:  Lab Results   Component Value Date    WBC 6.81 08/16/2022    HGB 12.5 08/16/2022    HCT 36.5 (L) 08/16/2022     08/16/2022    CHOL 215 (H) 10/14/2021    TRIG 112 10/14/2021    HDL 80 (H) 10/14/2021    ALT 10 08/16/2022    AST 16 08/16/2022     08/16/2022    K 3.7 08/16/2022     08/16/2022    CREATININE 0.8 08/16/2022    BUN 5 (L) 08/16/2022    CO2 27 08/16/2022    TSH 1.134 08/16/2022       I have explained the risks and benefits of this endoscopic procedure to the patient including but not limited to bleeding, inflammation, infection, perforation, and death.      Zana Saldana MD

## 2022-09-29 NOTE — ANESTHESIA PREPROCEDURE EVALUATION
2022  Lucía Jewell is a 37 y.o., female.      Pre-op Assessment    I have reviewed the Patient Summary Reports.     I have reviewed the Nursing Notes. I have reviewed the NPO Status.   I have reviewed the Medications.     Review of Systems  Hematology/Oncology:  Hematology Normal   Oncology Normal     EENT/Dental:EENT/Dental Normal   Cardiovascular:  Cardiovascular Normal     Pulmonary:  Pulmonary Normal    Renal/:  Renal/ Normal     Hepatic/GI:   PUD, Ulcerative colitis   Musculoskeletal:  Musculoskeletal Normal    Neurological:  Neurology Normal    Endocrine:  Endocrine Normal    Psych:   Psychiatric History          Physical Exam  General: Well nourished    Airway:  Mallampati: II / I  Mouth Opening: Normal  TM Distance: Normal  Tongue: Normal  Neck ROM: Normal ROM    Dental:  Intact    Chest/Lungs:  Clear to auscultation, Normal Respiratory Rate    Heart:  Rate: Normal        Anesthesia Plan  Type of Anesthesia, risks & benefits discussed:    Anesthesia Type: Gen Natural Airway  Intra-op Monitoring Plan: Standard ASA Monitors  Post Op Pain Control Plan: multimodal analgesia  Induction:  IV  Informed Consent: Informed consent signed with the Patient and all parties understand the risks and agree with anesthesia plan.  All questions answered.   ASA Score: 2  Day of Surgery Review of History & Physical: H&P Update referred to the surgeon/provider.    Ready For Surgery From Anesthesia Perspective.     Past Medical History:   Diagnosis Date    Immunosuppressed status 2022    Irritable bowel syndrome     Mental disorder     depression and anxiety    Ulcerative proctitis 2022     Past Surgical History:   Procedure Laterality Date     SECTION      CHOLECYSTECTOMY      TONSILLECTOMY, ADENOIDECTOMY      WISDOM TOOTH EXTRACTION         .

## 2022-09-29 NOTE — ANESTHESIA POSTPROCEDURE EVALUATION
Anesthesia Post Evaluation    Patient: Lucía Jewell    Procedure(s) Performed: Procedure(s) (LRB):  COLONOSCOPY (N/A)    Final Anesthesia Type: general      Patient location during evaluation: GI PACU  Patient participation: Yes- Able to Participate  Level of consciousness: awake and alert  Post-procedure vital signs: reviewed and stable  Pain management: adequate  Airway patency: patent    PONV status at discharge: No PONV  Anesthetic complications: no      Cardiovascular status: blood pressure returned to baseline and stable  Respiratory status: unassisted, spontaneous ventilation and room air  Hydration status: euvolemic  Follow-up not needed.          Vitals Value Taken Time   /82 09/29/22 1355   Temp 36.6 °C (97.8 °F) 09/29/22 1321   Pulse 73 09/29/22 1355   Resp 16 09/29/22 1355   SpO2 100 % 09/29/22 1355         Event Time   Out of Recovery 13:58:55         Pain/Ezequiel Score: Ezequiel Score: 10 (9/29/2022  1:55 PM)

## 2022-09-29 NOTE — TRANSFER OF CARE
"Anesthesia Transfer of Care Note    Patient: Lucía Jewell    Procedure(s) Performed: Procedure(s) (LRB):  COLONOSCOPY (N/A)    Patient location: GI    Anesthesia Type: general    Transport from OR: Transported from OR on room air with adequate spontaneous ventilation    Post pain: adequate analgesia    Post assessment: no apparent anesthetic complications and tolerated procedure well    Post vital signs: stable    Level of consciousness: awake and alert    Nausea/Vomiting: no nausea/vomiting    Complications: none    Transfer of care protocol was followed      Last vitals:   Visit Vitals  /67 (BP Location: Left arm, Patient Position: Lying)   Pulse 77   Temp 36.5 °C (97.7 °F) (Temporal)   Resp 16   Ht 5' 1" (1.549 m)   Wt 51.7 kg (114 lb)   SpO2 100%   Breastfeeding No   BMI 21.54 kg/m²     "

## 2022-09-29 NOTE — PR[employer]ATION PATIENT INSTRUCTIONS
Discharge Summary/Instructions after an Endoscopic Procedure  Patient Name: Lucía Jewell  Patient MRN: [id number]  Patient YOB: 1985 Thursday, September 29, 2022  Zana Saldana MD  Dear patient,  As a result of recent federal legislation (The Federal Cures Act), you may   receive lab or pathology results from your procedure in your [facility]   account before your physician is able to contact you. Your physician or   their representative will relay the results to you with their   recommendations at their soonest availability.  Thank you,  RESTRICTIONS:  During your procedure today, you received medications for sedation.  These   medications may affect your judgment, balance and coordination.  Therefore,   for 24 hours, you have the following restrictions:   - DO NOT drive a car, operate machinery, make legal/financial decisions,   sign important papers or drink alcohol.    ACTIVITY:  Today: no heavy lifting, straining or running due to procedural   sedation/anesthesia.  The following day: return to full activity including work.  DIET:  Eat and drink normally unless instructed otherwise.     TREATMENT FOR COMMON SIDE EFFECTS:  - Mild abdominal pain, nausea, belching, bloating or excessive gas:  rest,   eat lightly and use a heating pad.  - Sore Throat: treat with throat lozenges and/or gargle with warm salt   water.  - Because air was used during the procedure, expelling large amounts of air   from your rectum or belching is normal.  - If a bowel prep was taken, you may not have a bowel movement for 1-3 days.    This is normal.  SYMPTOMS TO WATCH FOR AND REPORT TO YOUR PHYSICIAN:  1. Abdominal pain or bloating, other than gas cramps.  2. Chest pain.  3. Back pain.  4. Signs of infection such as: chills or fever occurring within 24 hours   after the procedure.  5. Rectal bleeding, which would show as bright red, maroon, or black stools.   (A tablespoon of blood from the rectum is not serious,  especially if   hemorrhoids are present.)  6. Vomiting.  7. Weakness or dizziness.  GO DIRECTLY TO THE NEAREST EMERGENCY ROOM IF YOU HAVE ANY OF THE FOLLOWING:      Difficulty breathing              Chills and/or fever over 101 F   Persistent vomiting and/or vomiting blood   Severe abdominal pain   Severe chest pain   Black, tarry stools   Bleeding- more than one tablespoon   Any other symptom or condition that you feel may need urgent attention  Your doctor recommends these additional instructions:  If any biopsies were taken, your doctors clinic will contact you in 1 to 2   weeks with any results.  - Discharge patient to home.   - Patient has a contact number available for emergencies.  The signs and   symptoms of potential delayed complications were discussed with the   patient.  Return to normal activities tomorrow.  Written discharge   instructions were provided to the patient.   - Resume previous diet.   - Continue present medications.   - Await pathology results.   - Repeat colonoscopy (date not yet determined) to assess disease activity.   - Return to GI clinic as previously scheduled.  For questions, problems or results please call your physician - Zana Saldana MD at Work:  [phone].  [facility], EMERGENCY ROOM PHONE NUMBER: [phone]  IF A COMPLICATION OR EMERGENCY SITUATION ARISES AND YOU ARE UNABLE TO REACH   YOUR PHYSICIAN - GO DIRECTLY TO THE EMERGENCY ROOM.  Zana Saldana MD  9/29/2022 1:19:57 PM  This report has been verified and signed electronically.  Dear patient,  As a result of recent federal legislation (The Federal Cures Act), you may   receive lab or pathology results from your procedure in your [facility]   account before your physician is able to contact you. Your physician or   their representative will relay the results to you with their   recommendations at their soonest availability.  Thank you,  [employer]ATION

## 2022-09-30 ENCOUNTER — TELEPHONE (OUTPATIENT)
Dept: GASTROENTEROLOGY | Facility: CLINIC | Age: 37
End: 2022-09-30
Payer: COMMERCIAL

## 2022-09-30 DIAGNOSIS — K51.218 ULCERATIVE PROCTITIS WITH OTHER COMPLICATION: Primary | ICD-10-CM

## 2022-09-30 NOTE — TELEPHONE ENCOUNTER
Pt transferred to direct line  - F/u appt moved up to 10/7/22  - Informed of cost difference b/w Uceris foam & Tacrolimus suppositories; pt wishes to proceed w/ rectal foam  - All questions answered

## 2022-09-30 NOTE — TELEPHONE ENCOUNTER
LM for callback [id number]  - Move up f/u appt  - Uceris foam BID 30 d/s ($100) versus compounded tacrolimus suppositories qhs 30 d/s ($90-$120)

## 2022-10-01 RX ORDER — BUDESONIDE 2 MG/1
2 AEROSOL, FOAM RECTAL 2 TIMES DAILY
Qty: 133.6 G | Refills: 1 | Status: SHIPPED | OUTPATIENT
Start: 2022-10-01 | End: 2022-10-31

## 2022-10-05 LAB
FINAL PATHOLOGIC DIAGNOSIS: NORMAL
GROSS: NORMAL
Lab: NORMAL
MICROSCOPIC EXAM: NORMAL

## 2022-10-07 ENCOUNTER — OFFICE VISIT (OUTPATIENT)
Dept: GASTROENTEROLOGY | Facility: CLINIC | Age: 37
End: 2022-10-07
Payer: COMMERCIAL

## 2022-10-07 VITALS
HEIGHT: 61 IN | TEMPERATURE: 98 F | WEIGHT: 115.94 LBS | DIASTOLIC BLOOD PRESSURE: 65 MMHG | HEART RATE: 69 BPM | BODY MASS INDEX: 21.89 KG/M2 | SYSTOLIC BLOOD PRESSURE: 104 MMHG

## 2022-10-07 DIAGNOSIS — K51.218 ULCERATIVE PROCTITIS WITH OTHER COMPLICATION: Primary | ICD-10-CM

## 2022-10-07 DIAGNOSIS — D84.9 IMMUNOSUPPRESSED STATUS: ICD-10-CM

## 2022-10-07 PROCEDURE — 3074F SYST BP LT 130 MM HG: CPT | Mod: CPTII,S$GLB,, | Performed by: INTERNAL MEDICINE

## 2022-10-07 PROCEDURE — 3078F DIAST BP <80 MM HG: CPT | Mod: CPTII,S$GLB,, | Performed by: INTERNAL MEDICINE

## 2022-10-07 PROCEDURE — 3074F PR MOST RECENT SYSTOLIC BLOOD PRESSURE < 130 MM HG: ICD-10-PCS | Mod: CPTII,S$GLB,, | Performed by: INTERNAL MEDICINE

## 2022-10-07 PROCEDURE — 3008F BODY MASS INDEX DOCD: CPT | Mod: CPTII,S$GLB,, | Performed by: INTERNAL MEDICINE

## 2022-10-07 PROCEDURE — 99215 PR OFFICE/OUTPT VISIT, EST, LEVL V, 40-54 MIN: ICD-10-PCS | Mod: S$GLB,,, | Performed by: INTERNAL MEDICINE

## 2022-10-07 PROCEDURE — 1159F PR MEDICATION LIST DOCUMENTED IN MEDICAL RECORD: ICD-10-PCS | Mod: CPTII,S$GLB,, | Performed by: INTERNAL MEDICINE

## 2022-10-07 PROCEDURE — 1160F PR REVIEW ALL MEDS BY PRESCRIBER/CLIN PHARMACIST DOCUMENTED: ICD-10-PCS | Mod: CPTII,S$GLB,, | Performed by: INTERNAL MEDICINE

## 2022-10-07 PROCEDURE — 1160F RVW MEDS BY RX/DR IN RCRD: CPT | Mod: CPTII,S$GLB,, | Performed by: INTERNAL MEDICINE

## 2022-10-07 PROCEDURE — 3008F PR BODY MASS INDEX (BMI) DOCUMENTED: ICD-10-PCS | Mod: CPTII,S$GLB,, | Performed by: INTERNAL MEDICINE

## 2022-10-07 PROCEDURE — 99215 OFFICE O/P EST HI 40 MIN: CPT | Mod: S$GLB,,, | Performed by: INTERNAL MEDICINE

## 2022-10-07 PROCEDURE — 1159F MED LIST DOCD IN RCRD: CPT | Mod: CPTII,S$GLB,, | Performed by: INTERNAL MEDICINE

## 2022-10-07 PROCEDURE — 3078F PR MOST RECENT DIASTOLIC BLOOD PRESSURE < 80 MM HG: ICD-10-PCS | Mod: CPTII,S$GLB,, | Performed by: INTERNAL MEDICINE

## 2022-10-07 NOTE — PROGRESS NOTES
[facility]             Inflammatory Bowel Disease   Follow-up  Note              TODAY'S VISIT DATE:  10/7/2022    Chief Complaint:   Chief Complaint   Patient presents with    Ulcerative Colitis     PCP: TESSA MCCOY    Previous History:  Lucía Jewell is a 37 y.o.ulcerative colitis (proctitis), IBS, family history of celiac disease. In  she had constipation and underwent barium enema and after this rectal bleeding started.  In approximately  she had a colonoscopy in [facility] and she was diagnosed with ulcerative proctitis and treated with proctofoam 1-2 mos and this helped. From 3310-1396 she had intermittent constipation and blood in stool and treated this with miralax. In 2004 she started with rectal bleeding and colonoscopy showed distal 10 cm of rectum with granularity and friability and multiple erosions with remainder of colon and TI normal.  She was diagnosed with ulcerative proctitis and treated with asacol 400 mg BID which was optimized to 6.4 g/d but the number of pills/frequency was difficult at her age and she was only taking about 1/2 the dose recommended. She moved to [facility] from [place] in  and from 7133-6313 and when she met Dr. Alston due to difficult taking medicine asacol was discontinued and she was started on lialda 2.4 g/d. When she was on asacol from 8044-1588 symptoms would improve with flares a few times/year (constipation and rectal bleeding) and these were treated with 2 courses of prednisone, rowasa enemas and canasa suppositories (not compliant but it helped when she took it). At times she was also given miralax for constipation sx that seemed to precipitate the rectal bleeding.  While on lialda 2.4 g/day she was compliant with medicines though continued with flares 2-3 times/year treated miralax and had about once a year would take short course of prednisone. Patient delivered a healthy baby girl by  13 and during  pregnancy she felt well and was compliant lialda 2.4 g/d.  She saw Dr. Alston 2015 and reported abd pain that improved with defecation, 3-4 formed BMs/d, daily reflux, postprandial bloating.  She was prescribed librax and colonoscopy 9/21/15 showed normal colon with localized continuous erythema with mild granularity and erosions in the rectum to 10 cm. From 9708-5501 she continued on lialda and briefly due to insurance issues in early 2017 she took apriso for a few mos and went back to lialda at higher dose of 4.8 g/d in early summer 2017. In 4/2017 pt then established with Dr. Ott at with time she reported 20 small volumed stool/d with crampy abd pain on apriso 1.5 g/d and rowasa enemas added to her treatment. Flex sig 4/24/17 significant for diffuse granularity, friability, patchy ulcerations with loss of normal vascularity cw UC Jeknins score 2-3 and biopsies of rectum c/w chronic active proctitis with crypt abscesses. She took a short course of prednisone and transitioned to lialda 4.8 g/d and continued on rowasa qhs (compliant with 2 weeks). The combination of prednisone, lialda and rowasa enemas and she feels that the prednisone helped her retain the enemas.  In 11/2017 pt had some intermittent bleeding and patient asked to decrease lialda 2.4 g/d and rowasa enemas qhs.  Flex sig 5/11/18 significant for right at the anal verge in the most distal 1-2 cm faint granularity with mild loss of vascularity c/w Jenkins 1 disease. From 6947-8025 she was on intermittent courses rowasa enemas for 2 weeks and this would help when able to hold it in. By 10/2018 she reported if she stopped or skipped dose of rowasa enemas her symptoms would return and she continued on lialda 2.4 g/d and she was advised at this visit to start canasa suppositories qhs, decrease rowasa enemas to once a week, continue lialda 2.4 g/d. Due to ongoing symptoms she started Humira 12/2018 with good response and pt wanted to stop lialda by early 2019.  In 4/2020 due to dyspepsia she was placed on PPI and underwent US 10/2020 for RUQ abd pain which was normal with normal GB EF. She then continued on monotherapy with humira 40 mg SC q 2 weeks.  In approximately 11/2021 pt developed rectal bleeding and started on prednisone course.  On 12/3/21 pt had trough humira levels 15.6/Abs neg. Flex sig 1/31/22 showed circumferential erythema, grnularrity with loss of vascularity and focal punctate ulcers starting 10 cm from anus with biopsies of rectum c/w CMV neg moderately active proctitis and rectosigmoid colon bx normal. In 11/2021 she restarted lialda 4.8 g/d (good adherent). Due to continued symptoms Dr. Ott attempted to get weekly humira approved though this was denied by her insurance so on 3/22/22 she started on stelara with prednisone taper (stopped in 5/2022). Currently patient reports about 40 trips to the toilet of which 35 of those trips (2 trips per hour) are during the day and 5 trips nocturnal b/w hrs of 9pm-5am. Of the total trips to the toilet about 50% are soft to skinny formed stool and remaining are false trips with mucus and blood on toilet paper. Pt has cough, back pain, anxiety/depression. I met her in the IBD clinic on 8/16/22 at which time she was on stelara 90 mg SC q 8 weeks, lialda 4.8 g/d, canasa supp twice a week (urgency and so difficult to take supp) so we proceeded with stool studies to r/o infection and stool calprotectin and advised her to take off of work for 2 weeks and take a course of canasa supp BID.      Interval History:  - current IBD meds: Stelara 90 mg SC q 8 weeks (started 3/2022, LD 9/7, ND 11/2), lialda 4.8 g/d, uceris foam BID (started 10/5)  - took canasa BID (was on twice a week at visit 8/2022, BID 8/23/22, discontinued 9/30/22)  - 20 loose Bowel Movements/day, blood dark red intermittently since scope and yesterday- BRB  - 9/29/22 colonoscopy: The perianal and digital rectal examinations were normal. From anal verge to  20 cm mild to moderately active nflammation characterized by diffuse erythema, few superficial and linear ulcerations. From 20-25 cm there is mild erythema and linear ulcerations. From 25 cm to the cecum the mucosa is normal with good preservation of vasculature. Normal ICV. Biopsies TI normal. Biopsies of cecum/ascending with patchy minimal architectural distortion, transverse colon normal, descending with patchy minimal increase in lamina propria eosinophils, sigmoid normal, rectosigmoid with chronic active colitis with mild cryptitis, rectum with chronic active colitis, severe with ulceration/granulation tissue formation, CMV neg  - 22 stool calprotectin <27.1  - 22 stool cultures, C diff negative   - 22 stool calprotectin 273  - NSAID use:   - Narcotic use:   - Alternative/complementary meds for IBD:     Prior Pertinent Surgeries:   None    Pertinent Endoscopy/Imagin/2004 colonoscopy:  distal 10 cm of rectum with granularity and friability and multiple erosions with remainder of colon and TI normal  9/21/15 colonoscopy: normal colon with localized continuous erythema with mild granularity and erosions in the rectum to 10 cm  17 flex sig: diffuse granularity, friability, patchy ulcerations with loss of normal vascularity cw UC Jenkins score 2-3 and biopsies of rectum c/w chronic active proctitis with crypt abscesses.   18 flex sig: right at the anal verge in the most distal 1-2 cm faint granularity with mild loss of vascularity c/w Jenkins 1 disease.   10/2020 US: normal with normal GB EF.  22 flex sig: circumferential erythema, granularity with loss of vascularity and focal punctate ulcers starting 10 cm from anus with biopsies of rectum c/w CMV neg moderately active proctitis and rectosigmoid colon bx normal.    Therapeutic Drug Monitoring Labs:  12/3/21 trough humira levels 15.6/Abs neg    Prior IBD Therapies:  Lialda  Rowasa enemas- effective with combination of lialda, rowasa    Canasa suppositories- possibly effective in the past  Prednisone- effective in past  Humira 40 mg SC q 2 weeks (12/2018-approximately 2/2022)- secondary nonresponder with good levels/neg abs, weekly humira denied though good drug levels    Vaccinations:  No results found for: HEPBSAB  Lab Results   Component Value Date    HEPBSURFABQU POSITIVE 08/16/2022    HEPBSURFABQU >1,000 08/16/2022     Lab Results   Component Value Date    HEPAIGG Negative 08/16/2022     Lab Results   Component Value Date    VARICELLAZOS 2.98 (H) 08/16/2022    VARICELLAINT Positive (A) 08/16/2022       There is no immunization history on file for this patient.  Flu shot: defer   COVID vaccine/booster:  defer   PCV 20: recommended   Tetanus (TdaP): recommended   HPV:    NA  Meningococcal: NA  Hepatitis A:  recommended   MMR (live vaccine):        Shingrix: recommended     Review of Systems   Constitutional:  Negative for chills, fever and weight loss.   HENT:          No oral ulcers, dysphagia, oral thrush   Eyes:  Negative for blurred vision, pain and redness.   Respiratory:  Negative for cough and shortness of breath.    Cardiovascular:  Negative for chest pain.   Gastrointestinal:  Negative for abdominal pain, heartburn, nausea and vomiting.   Genitourinary:  Negative for dysuria and hematuria.   Musculoskeletal:  Negative for back pain and joint pain.   Skin:  Negative for rash.   Psychiatric/Behavioral:  Negative for depression. The patient is nervous/anxious. The patient does not have insomnia.      All Medical History/Surgical History/Family History/Social History/Allergies have been reviewed and updated in EMR    Review of patient's allergies indicates:  No Known Allergies    Outpatient Medications Marked as Taking for the 10/7/22 encounter (Office Visit) with Zana Saldana MD   Medication Sig Dispense Refill    budesonide (UCERIS) 2 mg/actuation Foam Place 2 mg rectally 2 (two) times a day. 133.6 g 1    buPROPion (WELLBUTRIN XL)  "150 MG TB24 tablet       desog-e.estradioL/e.estradioL (VIORELE, 28,) 0.15-0.02 mgx21 /0.01 mg x 5 per tablet Take 1 tablet by mouth once daily. 84 tablet 0    lamoTRIgine (LAMICTAL) 150 MG Tab Take 150 mg by mouth once daily.      loperamide (IMODIUM A-D) 2 mg Tab Take 2 mg by mouth 4 (four) times daily as needed. Prn      mesalamine (LIALDA) 1.2 gram TbEC Take 4.8 g by mouth.      multivitamin (THERAGRAN) per tablet Take 1 tablet by mouth once daily.      ustekinumab (STELARA) 90 mg/mL Syrg syringe Inject 90 mg into the skin every 8 weeks.       Vital Signs:  /65 (BP Location: Left arm, Patient Position: Sitting)   Pulse 69   Temp 97.9 °F (36.6 °C)   Ht 5' 1" (1.549 m)   Wt 52.6 kg (115 lb 15.4 oz)   BMI 21.91 kg/m²      Physical Exam  Constitutional:       General: She is not in acute distress.  Cardiovascular:      Rate and Rhythm: Normal rate and regular rhythm.      Pulses: Normal pulses.      Heart sounds: Normal heart sounds. No murmur heard.  Pulmonary:      Effort: Pulmonary effort is normal.      Breath sounds: Normal breath sounds.   Abdominal:      General: Bowel sounds are normal. There is no distension.      Palpations: Abdomen is soft.      Tenderness: There is no abdominal tenderness.       Labs:   Lab Results   Component Value Date    CRP 2.4 08/16/2022    CALPROTECTIN <27.1 08/18/2022     Lab Results   Component Value Date    HEPBSAG Negative 08/16/2022    HEPBCAB Negative 08/16/2022     Lab Results   Component Value Date    TBGOLDPLUS Negative 08/16/2022     Lab Results   Component Value Date    DZPLCIMO99VD 40 08/16/2022    CWDWRGDN14 459 08/16/2022     Lab Results   Component Value Date    WBC 6.81 08/16/2022    HGB 12.5 08/16/2022    HCT 36.5 (L) 08/16/2022    MCV 93 08/16/2022     08/16/2022     Lab Results   Component Value Date    CREATININE 0.8 08/16/2022    ALBUMIN 4.0 08/16/2022    BILITOT 0.4 08/16/2022    ALKPHOS 64 08/16/2022    AST 16 08/16/2022    ALT 10 08/16/2022 "     Assessment/Plan:  Lucía Jewell is a 37 y.o. female with ulcerative colitis (proctitis), IBS, family history of celiac disease who continues on stelara every 8 weeks, lialda 4.8 g/d and was previously on canasa BID but now uceris foam BID since 10/5 after colonoscopy showing ongoing proctitis in setting of canasa BID.  Initially when I met this patient she had a normal stool calprotectin though was having nearly 40 bowel movements a day and within a week of starting Canasa b.i.d. her symptoms significantly improved and reduced to 5 trips to the toilet/d.  She also took time off of work and was able to be compliant with her Canasa suppositories.  The part that confuses me is that her stool calprotectin was normal when we started this and then after a month of Canasa b.i.d. her stool calprotectin increased and we did a colonoscopy which showed ongoing proctitis in the setting of Canasa b.i.d..  At that point given that the inflammation was extending up to 25 cm from the anal verge though predominance was in the distal 20 cm I changed her to a different topical formulation with Uceris foam [place] BID.  She has only been taking this for approximately 2 days there was a delay in getting it and we are going to give her a total of 2 weeks on it with checking in with her regarding symptoms.  Her symptoms are not consistent and they are some days when she has more bowel movements and some days with no bowel movements and therefore it is hard to know how much is IBS versus proctitis and she has an absence of tenesmus symptoms as well.  Today we discussed the both diagnosis and that IBS is a diagnosis of exclusion we still need to work on getting her inflammation under better control.  I will be proceeding with a 6-8 week course of Uceris foam if she is responsive in the next 2-4 weeks along with continuing on Lialda and Stelara and planning on trough Stelara levels to see if this needs to be further optimized.  We did  discuss that Stelara in the setting of proctitis may or may not be effective and she may need to change in treatment in the future though this will depend on of coming course of treatment.  We also use stool calprotectin this to monitor disease closely and flex sigs as needed.       # Ulcerative colitis (proctitis)    - continue uceris foam [place] BID (started 10/5/22)- RN to check in on pt after 2 weeks of uceris foam BID, other tx options- tacrolimus suppository  - continue lialda 4.8 g/d  - continue stelara 90 mg SC q 8 weeks- may optimize treatment based on drug levels or change treatment if not responsive to topical therapy  - stool calprotectin- turn in at [facility] 11/1/22  - continue lialda 4.8 g/d  - continue stelara 90 mg SC q 8 weeks for now  - drug monitoring labs: CBC/CMP q 6 mos (2/2023), TPMT (normal 8/2022), TB quantiferon (neg 8/2022), Hep B testing (HBsAg, HBtotalcoreAb neg 8/2022)  - TDM: trough stelara levels/abs done with Dr Ott's office and will repeat 11/1/22    # IBD specific health maintenance:  CRC risk: average risk, proctitis  Skin exam yearly   Risk for osteopenia/osteoporosis- none  Pap smear- yearly  Vitamin D- normal, no RFs, not on supplementation  Vaccines: flu and covid booster recommended, pneumonia 20 and hep A #1, schedule f/u same day as hep A #2    Follow up: 2 mos virtual visit     I spent a total of 40 minutes on the day of the visit.This includes face to face time and on-face to face time preparing to see the patient (eg, review of tests, notes), obtaining and/or reviewing separately obtained history, documenting clinical information in the electronic or other health record, independently interpreting results and communicating results to the patient/family/caregiver, and coordinating care.       Zana Saldana MD  Department of Gastroenterology  Medical Director, Inflammatory Bowel Disease

## 2022-10-07 NOTE — PROGRESS NOTES
"IBD PATIENT INTAKE:    COVID symptoms in the last 7 days (runny nose, sore throat, congestion, cough, fever): No  PCP: TESSA MCCOY  If not PCP-  number given to establish [phone]: N/A    ALLERGIES REVIEWED:  Yes    CHIEF COMPLAINT:    Chief Complaint   Patient presents with    Ulcerative Colitis       VITAL SIGNS:  /65 (BP Location: Left arm, Patient Position: Sitting)   Pulse 69   Temp 97.9 °F (36.6 °C)   Ht 5' 1" (1.549 m)   Wt 52.6 kg (115 lb 15.4 oz)   BMI 21.91 kg/m²      Change in medical, surgical, family or social history: No    IBD THERAPY (name, dose/frequency):  uceris form BID, Lialda Take 4.8 g by mouth, stelara Q 8 weeks   Last dose:  9/7/2022     Next dose:  11/2/2022  Infusion/Pharmacy: [facility]    NSAIDs (aspirin, ibuprofen-advil or motrin, naproxen-aleve, diclofenac-voltaren, BC powder, excedrin, goodies): No    Alternative/Complementary Medications (i.e. probiotics, turmeric, fish oil, aloe vera):      no  Name/dose:  none    Vitamins:   Vit D:  no     Vit B-12:  no   Folic Acid: no       Calcium: no     Iron:  no      MVI: yes     Antibiotics (past 30 Days):  no  If yes   Indication:  Name of antibiotic:  Completion date:     REVIEWED MEDICATION LIST RECONCILED INCLUDING ABOVE MEDS:  yes                  Answers submitted by the patient for this visit:  Established Patient Questionnaire  (Submitted on 10/7/2022)  nervous/ anxious: Yes    "

## 2022-10-07 NOTE — PATIENT INSTRUCTIONS
- get stelara levels/abs from Dr. Ott's office 11/1/22  - stool calprotectin- turn in at [facility] on 11/1  - labs on 11/1- stelara levels/abs- [facility]  - flu and covid booster recommended  - pneumonia 20 and hep A #1  - schedule f/u same day as hep A #2

## 2022-10-20 ENCOUNTER — TELEPHONE (OUTPATIENT)
Dept: GASTROENTEROLOGY | Facility: CLINIC | Age: 37
End: 2022-10-20
Payer: COMMERCIAL

## 2022-10-20 NOTE — TELEPHONE ENCOUNTER
- current IBD meds: Stelara q 8 weeks (started 3/2022, LD 9/7, ND 11/2), lialda 4.8 g/d, uceris foam qhs (started BID 10/5)  - other GI meds: Imodium 2 tabs twice ~ 3 days/ week  - Using Uceris foam qhs d/t discomfort & inability to hold in AM dose; additional complaints of discomfort w/ pm dose though able to hold this in  - Reports good days though mostly has bad days  - Good days consist of no BM or pain a couple times weekly usually following a day where she has used imodium  - Bad days consist of 20+ loose-soft BM usually in the AM w/ rare false trips  - Feelings of urgency & the need to push following a immediately following a bowel movement ~5 x/ QD  - Blood on the toilet paper 1-2 x/ week  - Lower abdominal cramping 2/10 lasting until AM BMs are completed  - Will discuss w/ Dr. Saldana

## 2022-10-21 NOTE — TELEPHONE ENCOUNTER
Called & spoke to pt & reviewed plan below:  - Begin using Imodium 2 tablets qam; if no improvement in 1 week then add additional 1-2 tablets qhs to help w/ # of AM bowel movements  - Begin tacrolimus suppositories qam & continue Uceris foam qhs; will send RX information to pt via portal so she can find local pharmacy that can compound this  - Plan for repeat stool calprotectin 2 weeks after starting tacrolimus suppositories or 4 weeks since starting uceris foam (~11/2/22)  - Pt expressed understanding   - Reminder set to f/u on status of tacrolimus suppositories

## 2022-10-24 ENCOUNTER — PATIENT MESSAGE (OUTPATIENT)
Dept: GASTROENTEROLOGY | Facility: CLINIC | Age: 37
End: 2022-10-24
Payer: COMMERCIAL

## 2022-10-24 DIAGNOSIS — K51.218 ULCERATIVE PROCTITIS WITH OTHER COMPLICATION: Primary | ICD-10-CM

## 2022-10-24 RX ORDER — TACROLIMUS 1 MG/1
CAPSULE ORAL
Qty: 30 CAPSULE | Refills: 1 | Status: SHIPPED | OUTPATIENT
Start: 2022-10-24 | End: 2023-04-26

## 2022-10-25 ENCOUNTER — PATIENT MESSAGE (OUTPATIENT)
Dept: GASTROENTEROLOGY | Facility: CLINIC | Age: 37
End: 2022-10-25
Payer: COMMERCIAL

## 2022-10-28 ENCOUNTER — PATIENT MESSAGE (OUTPATIENT)
Dept: GASTROENTEROLOGY | Facility: CLINIC | Age: 37
End: 2022-10-28
Payer: COMMERCIAL

## 2022-10-31 NOTE — TELEPHONE ENCOUNTER
- Current IBD meds: Stelara q 8 weeks (LD: 9/7/22, ND: 11/2/22), Lialda 4.8 g/ QD, tacrolimus suppositories qam (started 10/28/22), & Uceris foam qhs (noncompliant- has used ~ 1-2x since 10/28/22), Imodium 2 mg/ QD (higher doses cause constipation per pt)  - Confirmed pt able to hold in tacrolimus suppositories for 3-4 hours  - Overall feels worse since starting tacrolimus suppositories d/t increased gas which causes abdominal discomfort & constant rectal burning  - Continues w/ ~20 trips to the bathroom QD of which 0-1 consists of formed stool; otherwise trips consist of gas & urgency  - 10/28/22 pt reports ~20 soft BM after eating a vegetarian burrito from [employer] w/ a Mountain Dew & later had a enchilada casserole with a lot of black beans   - Notes a small amt of blood on the toilet paper last night only  - Additional c/o a 2 day breakthrough menstrual cycle mid- October  - Will discuss w/ Dr. Saldana

## 2022-10-31 NOTE — TELEPHONE ENCOUNTER
Called & spoke to pt  - Informed that we need to give tacrolimus suppositories more time to work w/ plans for repeat calprotectin 2 weeks after starting this on 11/11/22  - Instructed to avoid caffeine & high fiber foods   - Will check in on pt 11/4/22  - Pt expressed understanding

## 2022-11-04 ENCOUNTER — TELEPHONE (OUTPATIENT)
Dept: GASTROENTEROLOGY | Facility: CLINIC | Age: 37
End: 2022-11-04
Payer: COMMERCIAL

## 2022-11-04 ENCOUNTER — PATIENT MESSAGE (OUTPATIENT)
Dept: GASTROENTEROLOGY | Facility: CLINIC | Age: 37
End: 2022-11-04
Payer: COMMERCIAL

## 2022-11-07 ENCOUNTER — PATIENT MESSAGE (OUTPATIENT)
Dept: GASTROENTEROLOGY | Facility: CLINIC | Age: 37
End: 2022-11-07
Payer: COMMERCIAL

## 2022-11-09 ENCOUNTER — TELEPHONE (OUTPATIENT)
Dept: GASTROENTEROLOGY | Facility: CLINIC | Age: 37
End: 2022-11-09
Payer: COMMERCIAL

## 2022-11-09 LAB
USTEKINUMAB AB SERPL IA-MCNC: <40 NG/ML
USTEKINUMAB SERPL IA-MCNC: 3.5 UG/ML

## 2022-11-09 NOTE — TELEPHONE ENCOUNTER
LM for callback [id number]  - Also need to discuss plan to submit calprotectin & C. Diff now & ASAP flex sig w/ rectal swab

## 2022-11-09 NOTE — TELEPHONE ENCOUNTER
----- Message from Zana Saldana MD sent at 11/9/2022  1:57 PM CST -----  Start insurance approval for stelara every 6 weeks.   Let pt know levels 3.5 and room to get higher levels to help with acute ongoing flare  After discussion please have Annemarie work on authorization

## 2022-11-10 ENCOUNTER — TELEPHONE (OUTPATIENT)
Dept: ENDOSCOPY | Facility: HOSPITAL | Age: 37
End: 2022-11-10
Payer: COMMERCIAL

## 2022-11-10 ENCOUNTER — TELEPHONE (OUTPATIENT)
Dept: GASTROENTEROLOGY | Facility: CLINIC | Age: 37
End: 2022-11-10
Payer: COMMERCIAL

## 2022-11-10 ENCOUNTER — PATIENT MESSAGE (OUTPATIENT)
Dept: GASTROENTEROLOGY | Facility: CLINIC | Age: 37
End: 2022-11-10
Payer: COMMERCIAL

## 2022-11-10 DIAGNOSIS — K51.218 ULCERATIVE PROCTITIS WITH OTHER COMPLICATION: Primary | ICD-10-CM

## 2022-11-10 RX ORDER — USTEKINUMAB 90 MG/ML
INJECTION, SOLUTION SUBCUTANEOUS
Qty: 1 EACH | Refills: 3 | Status: SHIPPED | OUTPATIENT
Start: 2022-11-10 | End: 2023-09-20

## 2022-11-10 NOTE — TELEPHONE ENCOUNTER
----- Message from Katiuska Mancia sent at 11/10/2022 11:27 AM CST -----  Regarding: Patient advice  Contact: Pt [phone]  Patient returning missed call from office in regards to last  please conversation with Ms. Ortega  all to discuss further

## 2022-11-11 ENCOUNTER — SPECIALTY PHARMACY (OUTPATIENT)
Dept: PHARMACY | Facility: CLINIC | Age: 37
End: 2022-11-11
Payer: COMMERCIAL

## 2022-11-11 NOTE — TELEPHONE ENCOUNTER
Errol, this is Amparo Smith with [facility].  We are working on your prescription that your doctor has sent us. We will be working with your insurance to get this approved for you. We will be calling you along the way with updates on your medication.  If you have any questions, you can reach us at [phone].    Welcome call outcome: Patient/caregiver reached    Pt mentioned that Stelara q6 week dosing was previously approved by her plan under previous provider.

## 2022-11-14 ENCOUNTER — TELEPHONE (OUTPATIENT)
Dept: ENDOSCOPY | Facility: HOSPITAL | Age: 37
End: 2022-11-14
Payer: COMMERCIAL

## 2022-11-14 ENCOUNTER — PATIENT MESSAGE (OUTPATIENT)
Dept: GASTROENTEROLOGY | Facility: CLINIC | Age: 37
End: 2022-11-14
Payer: COMMERCIAL

## 2022-11-15 LAB
C DIFF TOX GENS STL QL NAA+PROBE: NEGATIVE
CALPROTECTIN STL-MCNT: 30 UG/G (ref 0–120)

## 2022-11-15 NOTE — TELEPHONE ENCOUNTER
Stelara q6 week PA- received fax from plan that PA is not required until a future date.      Called [facility] dept ([phone]) to clarify and make sure that request was reviewed for q6 week dosing.     Per rep Loly, The plan already previously put in an override for 1 mL/42 days.   Approval code: [id number]  Dates: 8/8/22-3/18/23     Outside records do indicate that Dr. Gonzalezs has worked on 2nd level appeal for q6 week doing in the past.     Pt can fill Stelara 1 mL/42 days at [facility].  Next fill date 11/19/22.  Rep confirmed via paid test claim.      Called pt to update her- LVM.  Notified MD/MDO that approval is on file.    Rx routed to [facility].  De-enrolling.

## 2022-11-21 ENCOUNTER — PATIENT MESSAGE (OUTPATIENT)
Dept: GASTROENTEROLOGY | Facility: CLINIC | Age: 37
End: 2022-11-21
Payer: COMMERCIAL

## 2022-11-28 ENCOUNTER — TELEPHONE (OUTPATIENT)
Dept: GASTROENTEROLOGY | Facility: CLINIC | Age: 37
End: 2022-11-28
Payer: COMMERCIAL

## 2022-11-29 ENCOUNTER — PATIENT MESSAGE (OUTPATIENT)
Dept: GASTROENTEROLOGY | Facility: CLINIC | Age: 37
End: 2022-11-29
Payer: COMMERCIAL

## 2022-11-30 ENCOUNTER — PATIENT MESSAGE (OUTPATIENT)
Dept: GASTROENTEROLOGY | Facility: CLINIC | Age: 37
End: 2022-11-30
Payer: COMMERCIAL

## 2022-12-14 ENCOUNTER — OFFICE VISIT (OUTPATIENT)
Dept: GASTROENTEROLOGY | Facility: CLINIC | Age: 37
End: 2022-12-14
Payer: COMMERCIAL

## 2022-12-14 DIAGNOSIS — K51.218 ULCERATIVE PROCTITIS WITH OTHER COMPLICATION: Primary | ICD-10-CM

## 2022-12-14 DIAGNOSIS — D84.9 IMMUNOSUPPRESSED STATUS: ICD-10-CM

## 2022-12-14 PROCEDURE — 1159F MED LIST DOCD IN RCRD: CPT | Mod: CPTII,95,, | Performed by: INTERNAL MEDICINE

## 2022-12-14 PROCEDURE — 99215 PR OFFICE/OUTPT VISIT, EST, LEVL V, 40-54 MIN: ICD-10-PCS | Mod: 95,,, | Performed by: INTERNAL MEDICINE

## 2022-12-14 PROCEDURE — 99215 OFFICE O/P EST HI 40 MIN: CPT | Mod: 95,,, | Performed by: INTERNAL MEDICINE

## 2022-12-14 PROCEDURE — 1159F PR MEDICATION LIST DOCUMENTED IN MEDICAL RECORD: ICD-10-PCS | Mod: CPTII,95,, | Performed by: INTERNAL MEDICINE

## 2022-12-14 RX ORDER — MULTIVITAMIN
1 TABLET ORAL DAILY
COMMUNITY
End: 2023-04-26

## 2022-12-14 RX ORDER — MESALAMINE 1000 MG/1
500 SUPPOSITORY RECTAL
COMMUNITY
End: 2023-11-20 | Stop reason: SDUPTHER

## 2022-12-14 NOTE — LETTER
December 14, 2022        Rosalinda Ott MD  [address]  [place] [address]             [facility]Inflammatory [facility]  [address]  [facility] [address]  Phone: [phone]  Fax: [phone]   Patient: Lucía Jewell   MR Number: [id number]   YOB: 1985   Date of Visit: 12/14/2022       Dear Dr. Ott:    Thank you for referring Lucía Jewell to me for evaluation. Below are the relevant portions of my assessment and plan of care.            If you have questions, please do not hesitate to call me. I look forward to following Lucía along with you.    Sincerely,      Zana Saldana MD           CC    No Recipients

## 2022-12-14 NOTE — PATIENT INSTRUCTIONS
- each week replace tacrolimus supp for a canasa supp and eventually in 3-4 weeks you should be on canasa nightly- make a calendar  - turn in stool calprotectin 1/12/23 at [facility]  - labs [facility] 3/7/23- CBC, CMP, stelara levels/abs  - vaccines- pneumonia 20, Hep A #1 on 3/7/23, 2nd hep due and schedule 6 mos after the first one

## 2022-12-14 NOTE — PROGRESS NOTES
IBD PATIENT INTAKE:    COVID symptoms in the last 7 days (runny nose, sore throat, congestion, cough, fever): No  PCP: TESSA MCCOY  If not PCP-  number given to establish [phone]: N/A    ALLERGIES REVIEWED:  Yes    CHIEF COMPLAINT:    Chief Complaint   Patient presents with    Ulcerative Colitis       VITAL SIGNS:  There were no vitals taken for this visit.     Change in medical, surgical, family or social history: No    IBD THERAPY (name, dose/frequency):  Stelara Q 6 weeks, Lialda 4.8 g daily Canasa supp in the PM every other day and tacrolimus Supp in the PM every other day. Started [place]a supp 11/30/22    Last dose:  11/2/22    Next dose:  12/14/22  Infusion/Pharmacy: [facility]    NSAIDs (aspirin, ibuprofen-advil or motrin, naproxen-aleve, diclofenac-voltaren, BC powder, excedrin, goodies): No    Alternative/Complementary Medications (i.e. probiotics, turmeric, fish oil, aloe vera):      no  Name/dose:  none    Vitamins:   Vit D:  no     Vit B-12:  no   Folic Acid: no       Calcium: no     Iron:  no      MVI: yes    Antibiotics (past 30 Days):  no  If yes   Indication:  Name of antibiotic:  Completion date:     REVIEWED MEDICATION LIST RECONCILED INCLUDING ABOVE MEDS:  yes

## 2022-12-14 NOTE — PROGRESS NOTES
[facility]             Inflammatory Bowel Disease   Follow-up  Note              TODAY'S VISIT DATE:  2022    Chief Complaint:   Chief Complaint   Patient presents with    Ulcerative Colitis     PCP: TESSA MCCOY    Previous History:  Lucía Jewell is a 37 y.o.ulcerative colitis (proctitis), IBS, family history of celiac disease. In  she had constipation and underwent barium enema and after this rectal bleeding started.  In approximately  she had a colonoscopy in [facility] and she was diagnosed with ulcerative proctitis and treated with proctofoam 1-2 mos and this helped. From 8260-9573 she had intermittent constipation and blood in stool and treated this with miralax. In 2004 she started with rectal bleeding and colonoscopy showed distal 10 cm of rectum with granularity and friability and multiple erosions with remainder of colon and TI normal.  She was diagnosed with ulcerative proctitis and treated with asacol 400 mg BID which was optimized to 6.4 g/d but the number of pills/frequency was difficult at her age and she was only taking about 1/2 the dose recommended. She moved to [facility] from [place] in  and from [id number] and when she met Dr. Alston due to difficult taking medicine asacol was discontinued and she was started on lialda 2.4 g/d. When she was on asacol from 6069-2512 symptoms would improve with flares a few times/year (constipation and rectal bleeding) and these were treated with 2 courses of prednisone, rowasa enemas and canasa suppositories (not compliant but it helped when she took it). At times she was also given miralax for constipation sx that seemed to precipitate the rectal bleeding.  While on lialda 2.4 g/day she was compliant with medicines though continued with flares 2-3 times/year treated miralax and had about once a year would take short course of prednisone. Patient delivered a healthy baby girl by  13 and during  pregnancy she felt well and was compliant lialda 2.4 g/d.  She saw Dr. Alston 2015 and reported abd pain that improved with defecation, 3-4 formed BMs/d, daily reflux, postprandial bloating.  She was prescribed librax and colonoscopy 9/21/15 showed normal colon with localized continuous erythema with mild granularity and erosions in the rectum to 10 cm. From 3263-3769 she continued on lialda and briefly due to insurance issues in early 2017 she took apriso for a few mos and went back to lialda at higher dose of 4.8 g/d in early summer 2017. In 4/2017 pt then established with Dr. Ott at with time she reported 20 small volumed stool/d with crampy abd pain on apriso 1.5 g/d and rowasa enemas added to her treatment. Flex sig 4/24/17 significant for diffuse granularity, friability, patchy ulcerations with loss of normal vascularity cw UC Jenkins score 2-3 and biopsies of rectum c/w chronic active proctitis with crypt abscesses. She took a short course of prednisone and transitioned to lialda 4.8 g/d and continued on rowasa qhs (compliant with 2 weeks). The combination of prednisone, lialda and rowasa enemas and she feels that the prednisone helped her retain the enemas.  In 11/2017 pt had some intermittent bleeding and patient asked to decrease lialda 2.4 g/d and rowasa enemas qhs.  Flex sig 5/11/18 significant for right at the anal verge in the most distal 1-2 cm faint granularity with mild loss of vascularity c/w Jenkins 1 disease. From 7624-8316 she was on intermittent courses rowasa enemas for 2 weeks and this would help when able to hold it in. By 10/2018 she reported if she stopped or skipped dose of rowasa enemas her symptoms would return and she continued on lialda 2.4 g/d and she was advised at this visit to start canasa suppositories qhs, decrease rowasa enemas to once a week, continue lialda 2.4 g/d. Due to ongoing symptoms she started Humira 12/2018 with good response and pt wanted to stop lialda by early 2019.  In 4/2020 due to dyspepsia she was placed on PPI and underwent US 10/2020 for RUQ abd pain which was normal with normal GB EF. She then continued on monotherapy with humira 40 mg SC q 2 weeks.  In approximately 11/2021 pt developed rectal bleeding and started on prednisone course.  On 12/3/21 pt had trough humira levels 15.6/Abs neg. Flex sig 1/31/22 showed circumferential erythema, grnularrity with loss of vascularity and focal punctate ulcers starting 10 cm from anus with biopsies of rectum c/w CMV neg moderately active proctitis and rectosigmoid colon bx normal. In 11/2021 she restarted lialda 4.8 g/d (good adherent). Due to continued symptoms Dr. Ott attempted to get weekly humira approved though this was denied by her insurance so on 3/22/22 she started on stelara with prednisone taper (stopped in 5/2022). Currently patient reports about 40 trips to the toilet of which 35 of those trips (2 trips per hour) are during the day and 5 trips nocturnal b/w hrs of 9pm-5am. Of the total trips to the toilet about 50% are soft to skinny formed stool and remaining are false trips with mucus and blood on toilet paper. Pt has cough, back pain, anxiety/depression. I met her in the IBD clinic on 8/16/22 at which time she was on stelara 90 mg SC q 8 weeks, lialda 4.8 g/d, canasa supp twice a week (urgency and so difficult to take supp) so we proceeded with stool studies to r/o infection and stool calprotectin and advised her to take off of work for 2 weeks and take a course of canasa supp BID. Prior to starting canasa BID she had diarrhea and stool studies neg for infection and suprisingly her stool calprotectin was normal. She took a course of canasa BID from 8/23/22-9/30/22 but then had worsening diarrhea in early 9/2022 and repeat stool calprotectin 9/2022 273.  Meanwhile she continued on stelara every 8 weeks and lialda 4.8 g/d and started uceris foam BID 10/5/22 and took this for 2 weeks without improvement of symptoms.      Interval History:  - current IBD meds: Stelara 90 mg SC q 8 weeks (started 3/2022, 90 mg SC q 6 weeks 11/2/22, LD 11/2, ND 12/14), lialda 4.8 g/d, tacrolimus supp qod (started qhs 10/28/22), canasa qod (started 11/30/22)  - uceris foam BID prescribed though pt initially taking qhs and then only taking 1-2 times/week (10/2022)  - insurance auth for stelara q 6 weeks  - 11/1/22 trough stelara levels 3.5/Abs neg, approved for stelara every 6 weeks and started 11/2/22  - 11/11/22 stool calprotectin 30  - 11/30/22 told to alternate tacrolimus supp qhs/canasa supp qhs  - NSAID use: No  - Narcotic use: No  - Alternative/complementary meds for IBD: No    Prior Pertinent Surgeries:   None    Last pertinent Endoscopy/Imaging:   10/2020 US: normal with normal GB EF.  9/29/22 colonoscopy: The perianal and digital rectal examinations were normal. From anal verge to 20 cm mild to moderately active nflammation characterized by diffuse erythema, few superficial and linear ulcerations. From 20-25 cm there is mild erythema and linear ulcerations. From 25 cm to the cecum the mucosa is normal with good preservation of vasculature. Normal ICV. Biopsies TI normal. Biopsies of cecum/ascending with patchy minimal architectural distortion, transverse colon normal, descending with patchy minimal increase in lamina propria eosinophils, sigmoid normal, rectosigmoid with chronic active colitis with mild cryptitis, rectum with chronic active colitis, severe with ulceration/granulation tissue formation, CMV neg    Therapeutic Drug Monitoring Labs:  12/3/21 trough humira levels 15.6/Abs neg  11/1/22 trough stelara levels 3.5/Abs neg (stelara 90 mg SC q 8 weeks)    Prior IBD Therapies:  Lialda  Rowasa enemas- effective with combination of lialda, rowasa   Canasa suppositories- ineffective, took BID 8/2022-9/2022  Prednisone- effective in past  Humira 40 mg SC q 2 weeks (12/2018-approximately 2/2022)- secondary nonresponder with good levels/neg  abs, weekly humira denied though good drug levels  uceris foam BID prescribed though pt initially taking qhs and then only taking 1-2 times/week (10/2022)    Vaccinations:  No results found for: HEPBSAB  Lab Results   Component Value Date    HEPBSURFABQU POSITIVE 08/16/2022    HEPBSURFABQU >1,000 08/16/2022     Lab Results   Component Value Date    HEPAIGG Negative 08/16/2022     Lab Results   Component Value Date    VARICELLAZOS 2.98 (H) 08/16/2022    VARICELLAINT Positive (A) 08/16/2022     There is no immunization history for the selected administration types on file for this patient.  Flu shot: defer   COVID vaccine/booster:  defer   PCV 20: recommended   Tetanus (TdaP): recommended   HPV:    NA  Meningococcal: NA  Hepatitis A:  recommended   MMR (live vaccine):        Shingrix: recommended     Review of Systems   Constitutional:  Negative for chills, fever and weight loss.   HENT:          No oral ulcers, dysphagia, oral thrush   Eyes:  Negative for blurred vision, pain and redness.   Respiratory:  Negative for cough and shortness of breath.    Cardiovascular:  Negative for chest pain.   Gastrointestinal:  Negative for abdominal pain, heartburn, nausea and vomiting.   Genitourinary:  Negative for dysuria and hematuria.   Musculoskeletal:  Negative for back pain and joint pain.   Skin:  Negative for rash.   Psychiatric/Behavioral:  Negative for depression. The patient is not nervous/anxious and does not have insomnia.      All Medical History/Surgical History/Family History/Social History/Allergies have been reviewed and updated in EMR    Review of patient's allergies indicates:  No Known Allergies    Outpatient Medications Marked as Taking for the 12/14/22 encounter (Office Visit) with Zana Saldana MD   Medication Sig Dispense Refill    buPROPion (WELLBUTRIN XL) 300 MG 24 hr tablet Take 1 tablet (300 mg total) by mouth once daily. 30 tablet 5    desog-e.estradioL/e.estradioL (VIORELE, 28,) 0.15-0.02 mgx21  /0.01 mg x 5 per tablet Take 1 tablet by mouth once daily. 84 tablet 0    lamoTRIgine (LAMICTAL) 150 MG Tab Take 150 mg by mouth once daily.      mesalamine (CANASA) 1000 MG Supp Place 500 mg rectally. Every other night      mesalamine (LIALDA) 1.2 gram TbEC Take 4.8 g by mouth.      multivitamin (ONE DAILY MULTIVITAMIN) per tablet Take 1 tablet by mouth once daily.      tacrolimus (PROGRAF) 1 MG Cap 2 mg tacrolimus compounded with MBK base Take 1 suppository per rectum at bedtime (Patient taking differently: 2 mg tacrolimus compounded with MBK base Take 1 suppository per rectum every other night) 30 capsule 1    ustekinumab (STELARA) 90 mg/mL Syrg syringe 90 mg every 6 weeks 1 each 3    [DISCONTINUED] multivitamin (THERAGRAN) per tablet Take 1 tablet by mouth once daily.       Vital Signs:  There were no vitals taken for this visit.     Physical Exam    Labs:   Lab Results   Component Value Date    CRP 2.4 08/16/2022    CALPROTECTIN <27.1 08/18/2022     Lab Results   Component Value Date    HEPBSAG Negative 08/16/2022    HEPBCAB Negative 08/16/2022     Lab Results   Component Value Date    TBGOLDPLUS Negative 08/16/2022     Lab Results   Component Value Date    BENVUJHU10SY 40 08/16/2022    [id number] 459 08/16/2022     Lab Results   Component Value Date    WBC 6.81 08/16/2022    HGB 12.5 08/16/2022    HCT 36.5 (L) 08/16/2022    MCV 93 08/16/2022     08/16/2022     Lab Results   Component Value Date    CREATININE 0.8 08/16/2022    ALBUMIN 4.0 08/16/2022    BILITOT 0.4 08/16/2022    ALKPHOS 64 08/16/2022    AST 16 08/16/2022    ALT 10 08/16/2022     Assessment/Plan:  Lucía Jewell is a 37 y.o. female with ulcerative colitis (proctitis), IBS, family history of celiac disease who is in remission for the first time after course of tacrolimus suppositories which was started 10/28/22 and then alternated with canasa supp 11/30/22 and now will replace one tacrolimus supp with canasa supp.  She was not compliant  with uceris foam so not sure if was ineffective but she had difficulty with it BID. Today we discussed importance of making one change at a time slowly. As we transition her to nightly canasa we will continue lialda 4.8 g/d and have optimized her stelara to 90 mg SC q 6 weeks since 11/2/22.  She will touch base with her primary GI Dr Cabrera as well.  I will repeat stelara trough levels 3/7/23 and repeat stool calprotectin in 1 month though last normal 11/2022.      # Ulcerative colitis (proctitis)    - continue lialda 4.8 g/d  - continue stelara 90 mg SC q 6 weeks  - stool calprotectin 1/12/23  - drug monitoring labs: CBC/CMP q 6 mos (3/7/2023), TPMT (normal 8/2022), TB quantiferon (neg 8/2022), Hep B testing (HBsAg, HBtotalcoreAb neg 8/2022)  - TDM: trough stelara levels/abs 3/7/23    # IBD specific health maintenance:  CRC risk: average risk, proctitis  Skin exam yearly   Risk for osteopenia/osteoporosis- none  Pap smear- yearly  Vitamin D- normal, no RFs, not on supplementation  Vaccines: no live vaccines, flu shot recommended, pneumonia 20 and hep A #1 3/7/23, schedule f/u same day as hep A #2    Follow up: 4 months     The patient location is: home  The chief complaint leading to consultation is: ulcerative colitis     Visit type: audiovisual    Face to Face time with patient: 20 minutes  40 minutes of total time spent on the encounter, which includes face to face time and non-face to face time preparing to see the patient (eg, review of tests), Obtaining and/or reviewing separately obtained history, Documenting clinical information in the electronic or other health record, Independently interpreting results (not separately reported) and communicating results to the patient/family/caregiver, or Care coordination (not separately reported).     Each patient to whom he or she provides medical services by telemedicine is:  (1) informed of the relationship between the physician and patient and the respective role of  any other health care provider with respect to management of the patient; and (2) notified that he or she may decline to receive medical services by telemedicine and may withdraw from such care at any time.    Zana Saldana MD  Department of Gastroenterology  Medical Director, Inflammatory Bowel Disease

## 2023-01-04 ENCOUNTER — PATIENT MESSAGE (OUTPATIENT)
Dept: GASTROENTEROLOGY | Facility: CLINIC | Age: 38
End: 2023-01-04
Payer: COMMERCIAL

## 2023-01-06 ENCOUNTER — PATIENT MESSAGE (OUTPATIENT)
Dept: GASTROENTEROLOGY | Facility: CLINIC | Age: 38
End: 2023-01-06
Payer: COMMERCIAL

## 2023-01-11 ENCOUNTER — PATIENT MESSAGE (OUTPATIENT)
Dept: GASTROENTEROLOGY | Facility: CLINIC | Age: 38
End: 2023-01-11
Payer: COMMERCIAL

## 2023-01-12 NOTE — TELEPHONE ENCOUNTER
Call discuss and spoke to patient  -advise to have Carmel contact pharmacy to have prescription transferred   -RX is $80.00 at [facility] with [facility]   -advise patient will discuss gas pains and strelara with Dr Saldana and will be in touch   -pt expressed understanding   -all questions and concerns were answered

## 2023-01-13 ENCOUNTER — TELEPHONE (OUTPATIENT)
Dept: GASTROENTEROLOGY | Facility: CLINIC | Age: 38
End: 2023-01-13
Payer: COMMERCIAL

## 2023-01-13 ENCOUNTER — PATIENT MESSAGE (OUTPATIENT)
Dept: GASTROENTEROLOGY | Facility: CLINIC | Age: 38
End: 2023-01-13
Payer: COMMERCIAL

## 2023-01-13 NOTE — TELEPHONE ENCOUNTER
----- Message from Marcella Graham MA sent at 12/14/2022 11:28 AM CST -----  Did pt turn in stool calprotectin 1/12/23 at [facility]

## 2023-01-18 ENCOUNTER — TELEPHONE (OUTPATIENT)
Dept: GASTROENTEROLOGY | Facility: CLINIC | Age: 38
End: 2023-01-18
Payer: COMMERCIAL

## 2023-01-18 ENCOUNTER — PATIENT MESSAGE (OUTPATIENT)
Dept: GASTROENTEROLOGY | Facility: CLINIC | Age: 38
End: 2023-01-18
Payer: COMMERCIAL

## 2023-01-18 NOTE — TELEPHONE ENCOUNTER
----- Message from Marcella Graham MA sent at 12/14/2022 11:28 AM CST -----  Did pt turn in stool calprotectin 1/12/23 at [facility]

## 2023-01-19 LAB — CALPROTECTIN STL-MCNT: 21 UG/G (ref 0–120)

## 2023-01-20 ENCOUNTER — PATIENT MESSAGE (OUTPATIENT)
Dept: GASTROENTEROLOGY | Facility: CLINIC | Age: 38
End: 2023-01-20
Payer: COMMERCIAL

## 2023-01-25 ENCOUNTER — PATIENT MESSAGE (OUTPATIENT)
Dept: GASTROENTEROLOGY | Facility: CLINIC | Age: 38
End: 2023-01-25
Payer: COMMERCIAL

## 2023-01-26 ENCOUNTER — PATIENT MESSAGE (OUTPATIENT)
Dept: GASTROENTEROLOGY | Facility: CLINIC | Age: 38
End: 2023-01-26
Payer: COMMERCIAL

## 2023-01-26 NOTE — TELEPHONE ENCOUNTER
Per Dr Saldana   -if tooth is infected okay to take ABX  -if she starts abx take canasa nightly         Called and spoke to patient   -discuss Dr Saldana message above  -pt expressed understanding   -all questions and concerns were answered

## 2023-02-03 ENCOUNTER — PATIENT MESSAGE (OUTPATIENT)
Dept: GASTROENTEROLOGY | Facility: CLINIC | Age: 38
End: 2023-02-03
Payer: COMMERCIAL

## 2023-02-06 ENCOUNTER — TELEPHONE (OUTPATIENT)
Dept: ENDOSCOPY | Facility: HOSPITAL | Age: 38
End: 2023-02-06
Payer: COMMERCIAL

## 2023-02-10 ENCOUNTER — TELEPHONE (OUTPATIENT)
Dept: ENDOSCOPY | Facility: HOSPITAL | Age: 38
End: 2023-02-10
Payer: COMMERCIAL

## 2023-02-17 ENCOUNTER — PATIENT MESSAGE (OUTPATIENT)
Dept: GASTROENTEROLOGY | Facility: CLINIC | Age: 38
End: 2023-02-17
Payer: COMMERCIAL

## 2023-02-17 ENCOUNTER — TELEPHONE (OUTPATIENT)
Dept: GASTROENTEROLOGY | Facility: CLINIC | Age: 38
End: 2023-02-17
Payer: COMMERCIAL

## 2023-02-22 ENCOUNTER — PATIENT MESSAGE (OUTPATIENT)
Dept: GASTROENTEROLOGY | Facility: CLINIC | Age: 38
End: 2023-02-22
Payer: COMMERCIAL

## 2023-02-23 DIAGNOSIS — K51.218 ULCERATIVE PROCTITIS WITH OTHER COMPLICATION: Primary | ICD-10-CM

## 2023-02-24 ENCOUNTER — TELEPHONE (OUTPATIENT)
Dept: GASTROENTEROLOGY | Facility: CLINIC | Age: 38
End: 2023-02-24
Payer: COMMERCIAL

## 2023-02-24 NOTE — TELEPHONE ENCOUNTER
Called and spoke Jennifer at [facility]   -pt dropped off stool sample   -meed insurance information is needed  -insurance information was given

## 2023-02-24 NOTE — TELEPHONE ENCOUNTER
----- Message from Ariadna Hastings sent at 2/24/2023  2:50 PM CST -----  Regarding: Pt Advice  Jennifer([facility])  Is requesting a Call back Regarding pt Stool Sample  Caller states pt Insurance is Not On file and will be Label as Drop Off   Caller states Can pt Demo Graphic be Fax to Office Please call to discuss further         Phone  [phone]      Fax  [phone]

## 2023-02-27 ENCOUNTER — PATIENT MESSAGE (OUTPATIENT)
Dept: GASTROENTEROLOGY | Facility: CLINIC | Age: 38
End: 2023-02-27
Payer: COMMERCIAL

## 2023-02-27 DIAGNOSIS — K51.218 ULCERATIVE PROCTITIS WITH OTHER COMPLICATION: Primary | ICD-10-CM

## 2023-03-03 ENCOUNTER — PATIENT MESSAGE (OUTPATIENT)
Dept: GASTROENTEROLOGY | Facility: CLINIC | Age: 38
End: 2023-03-03
Payer: COMMERCIAL

## 2023-03-04 LAB — CALPROTECTIN STL-MCNT: 21 UG/G (ref 0–120)

## 2023-03-06 ENCOUNTER — PATIENT MESSAGE (OUTPATIENT)
Dept: GASTROENTEROLOGY | Facility: CLINIC | Age: 38
End: 2023-03-06
Payer: COMMERCIAL

## 2023-03-07 ENCOUNTER — LAB VISIT (OUTPATIENT)
Dept: LAB | Facility: HOSPITAL | Age: 38
End: 2023-03-07
Attending: INTERNAL MEDICINE
Payer: COMMERCIAL

## 2023-03-07 ENCOUNTER — CLINICAL SUPPORT (OUTPATIENT)
Dept: INFECTIOUS DISEASES | Facility: CLINIC | Age: 38
End: 2023-03-07
Payer: COMMERCIAL

## 2023-03-07 DIAGNOSIS — K51.218 ULCERATIVE PROCTITIS WITH OTHER COMPLICATION: ICD-10-CM

## 2023-03-07 LAB
ALBUMIN SERPL BCP-MCNC: 4 G/DL (ref 3.5–5.2)
ALP SERPL-CCNC: 65 U/L (ref 55–135)
ALT SERPL W/O P-5'-P-CCNC: 13 U/L (ref 10–44)
ANION GAP SERPL CALC-SCNC: 10 MMOL/L (ref 8–16)
AST SERPL-CCNC: 19 U/L (ref 10–40)
BASOPHILS # BLD AUTO: 0.04 K/UL (ref 0–0.2)
BASOPHILS NFR BLD: 0.6 % (ref 0–1.9)
BILIRUB SERPL-MCNC: 0.4 MG/DL (ref 0.1–1)
BUN SERPL-MCNC: 9 MG/DL (ref 6–20)
CALCIUM SERPL-MCNC: 9.5 MG/DL (ref 8.7–10.5)
CHLORIDE SERPL-SCNC: 103 MMOL/L (ref 95–110)
CO2 SERPL-SCNC: 25 MMOL/L (ref 23–29)
CREAT SERPL-MCNC: 0.9 MG/DL (ref 0.5–1.4)
DIFFERENTIAL METHOD: NORMAL
EOSINOPHIL # BLD AUTO: 0.2 K/UL (ref 0–0.5)
EOSINOPHIL NFR BLD: 2.1 % (ref 0–8)
ERYTHROCYTE [DISTWIDTH] IN BLOOD BY AUTOMATED COUNT: 12.3 % (ref 11.5–14.5)
EST. GFR  (NO RACE VARIABLE): >60 ML/MIN/1.73 M^2
GLUCOSE SERPL-MCNC: 79 MG/DL (ref 70–110)
HCT VFR BLD AUTO: 38.5 % (ref 37–48.5)
HGB BLD-MCNC: 12.5 G/DL (ref 12–16)
IMM GRANULOCYTES # BLD AUTO: 0.02 K/UL (ref 0–0.04)
IMM GRANULOCYTES NFR BLD AUTO: 0.3 % (ref 0–0.5)
LYMPHOCYTES # BLD AUTO: 2 K/UL (ref 1–4.8)
LYMPHOCYTES NFR BLD: 28 % (ref 18–48)
MCH RBC QN AUTO: 30.3 PG (ref 27–31)
MCHC RBC AUTO-ENTMCNC: 32.5 G/DL (ref 32–36)
MCV RBC AUTO: 93 FL (ref 82–98)
MONOCYTES # BLD AUTO: 0.6 K/UL (ref 0.3–1)
MONOCYTES NFR BLD: 8.6 % (ref 4–15)
NEUTROPHILS # BLD AUTO: 4.3 K/UL (ref 1.8–7.7)
NEUTROPHILS NFR BLD: 60.4 % (ref 38–73)
NRBC BLD-RTO: 0 /100 WBC
PLATELET # BLD AUTO: 333 K/UL (ref 150–450)
PMV BLD AUTO: 9.8 FL (ref 9.2–12.9)
POTASSIUM SERPL-SCNC: 3.6 MMOL/L (ref 3.5–5.1)
PROT SERPL-MCNC: 7.6 G/DL (ref 6–8.4)
RBC # BLD AUTO: 4.12 M/UL (ref 4–5.4)
SODIUM SERPL-SCNC: 138 MMOL/L (ref 136–145)
WBC # BLD AUTO: 7.1 K/UL (ref 3.9–12.7)

## 2023-03-07 PROCEDURE — 80299 QUANTITATIVE ASSAY DRUG: CPT | Performed by: INTERNAL MEDICINE

## 2023-03-07 PROCEDURE — 90472 PNEUMOCOCCAL CONJUGATE VACCINE 20-VALENT: ICD-10-PCS | Mod: S$GLB,,, | Performed by: INTERNAL MEDICINE

## 2023-03-07 PROCEDURE — 90471 IMMUNIZATION ADMIN: CPT | Mod: S$GLB,,, | Performed by: INTERNAL MEDICINE

## 2023-03-07 PROCEDURE — 83520 IMMUNOASSAY QUANT NOS NONAB: CPT | Performed by: INTERNAL MEDICINE

## 2023-03-07 PROCEDURE — 90677 PNEUMOCOCCAL CONJUGATE VACCINE 20-VALENT: ICD-10-PCS | Mod: S$GLB,,, | Performed by: INTERNAL MEDICINE

## 2023-03-07 PROCEDURE — 90632 HEPATITIS A VACCINE ADULT IM: ICD-10-PCS | Mod: S$GLB,,, | Performed by: INTERNAL MEDICINE

## 2023-03-07 PROCEDURE — 90677 PCV20 VACCINE IM: CPT | Mod: S$GLB,,, | Performed by: INTERNAL MEDICINE

## 2023-03-07 PROCEDURE — 85025 COMPLETE CBC W/AUTO DIFF WBC: CPT | Performed by: INTERNAL MEDICINE

## 2023-03-07 PROCEDURE — 90472 IMMUNIZATION ADMIN EACH ADD: CPT | Mod: S$GLB,,, | Performed by: INTERNAL MEDICINE

## 2023-03-07 PROCEDURE — 36415 COLL VENOUS BLD VENIPUNCTURE: CPT | Performed by: INTERNAL MEDICINE

## 2023-03-07 PROCEDURE — 90632 HEPA VACCINE ADULT IM: CPT | Mod: S$GLB,,, | Performed by: INTERNAL MEDICINE

## 2023-03-07 PROCEDURE — 90471 HEPATITIS A VACCINE ADULT IM: ICD-10-PCS | Mod: S$GLB,,, | Performed by: INTERNAL MEDICINE

## 2023-03-07 PROCEDURE — 80053 COMPREHEN METABOLIC PANEL: CPT | Performed by: INTERNAL MEDICINE

## 2023-03-07 NOTE — PROGRESS NOTES
Pt received Hep A & PCV20 IM in left arm, pt tolerated well and left clinic NAD after observation time.

## 2023-03-10 LAB
FUAUA DOSE (IN MG): 90
FUAUA INTERVAL (IN WEEKS): NORMAL
USTEKINUMAB AB SERPL IA-ACNC: <10 AU/ML
USTEKINUMAB SERPL IA-MCNC: 7.5 MCG/ML

## 2023-03-17 ENCOUNTER — PATIENT MESSAGE (OUTPATIENT)
Dept: GASTROENTEROLOGY | Facility: CLINIC | Age: 38
End: 2023-03-17
Payer: COMMERCIAL

## 2023-03-17 DIAGNOSIS — K51.218 ULCERATIVE PROCTITIS WITH OTHER COMPLICATION: Primary | ICD-10-CM

## 2023-03-20 RX ORDER — MESALAMINE 1.2 G/1
4.8 TABLET, DELAYED RELEASE ORAL DAILY
Qty: 120 TABLET | Refills: 5 | Status: SHIPPED | OUTPATIENT
Start: 2023-03-20 | End: 2023-09-20 | Stop reason: SDUPTHER

## 2023-03-20 NOTE — TELEPHONE ENCOUNTER
IBD medications Stelara q 8 weeks, Lialda 4.8 g/ QD, Canasa qhs    Refill request for Lialda    Allergies reviewed Yes    Drug Monitoring labs/frequency for all IBD meds:  CBC/CMP q 6 months; TB & Hep B yearly    Lab Results   Component Value Date    HEPBSAG Negative 08/16/2022    HEPBCAB Negative 08/16/2022     Lab Results   Component Value Date    TBGOLDPLUS Negative 08/16/2022     Lab Results   Component Value Date    RZLYLPEF23PD 40 08/16/2022    PGIMFURW62 459 08/16/2022     Lab Results   Component Value Date    WBC 7.10 03/07/2023    HGB 12.5 03/07/2023    HCT 38.5 03/07/2023    MCV 93 03/07/2023     03/07/2023     Lab Results   Component Value Date    CREATININE 0.9 03/07/2023    ALBUMIN 4.0 03/07/2023    BILITOT 0.4 03/07/2023    ALKPHOS 65 03/07/2023    AST 19 03/07/2023    ALT 13 03/07/2023       Lab due date (mo/yr):  9/2023    Labs scheduled: No    Next appt: 4/26/23    RX refill sent to provider for amount until next labs: Yes

## 2023-03-31 ENCOUNTER — PATIENT MESSAGE (OUTPATIENT)
Dept: GASTROENTEROLOGY | Facility: CLINIC | Age: 38
End: 2023-03-31
Payer: COMMERCIAL

## 2023-04-17 ENCOUNTER — PATIENT MESSAGE (OUTPATIENT)
Dept: GASTROENTEROLOGY | Facility: CLINIC | Age: 38
End: 2023-04-17
Payer: COMMERCIAL

## 2023-04-18 ENCOUNTER — TELEPHONE (OUTPATIENT)
Dept: GASTROENTEROLOGY | Facility: CLINIC | Age: 38
End: 2023-04-18
Payer: COMMERCIAL

## 2023-04-19 ENCOUNTER — PATIENT MESSAGE (OUTPATIENT)
Dept: GASTROENTEROLOGY | Facility: CLINIC | Age: 38
End: 2023-04-19
Payer: COMMERCIAL

## 2023-04-21 NOTE — TELEPHONE ENCOUNTER
PA appeal approved  Approval dates 3/21/2023-4/21/2024  SHANNON [facility] [id number] [id number]

## 2023-04-26 ENCOUNTER — OFFICE VISIT (OUTPATIENT)
Dept: GASTROENTEROLOGY | Facility: CLINIC | Age: 38
End: 2023-04-26
Payer: COMMERCIAL

## 2023-04-26 DIAGNOSIS — K51.218 ULCERATIVE PROCTITIS WITH OTHER COMPLICATION: Primary | ICD-10-CM

## 2023-04-26 PROCEDURE — 99215 OFFICE O/P EST HI 40 MIN: CPT | Mod: 95,,, | Performed by: INTERNAL MEDICINE

## 2023-04-26 PROCEDURE — 1159F MED LIST DOCD IN RCRD: CPT | Mod: CPTII,95,, | Performed by: INTERNAL MEDICINE

## 2023-04-26 PROCEDURE — 1159F PR MEDICATION LIST DOCUMENTED IN MEDICAL RECORD: ICD-10-PCS | Mod: CPTII,95,, | Performed by: INTERNAL MEDICINE

## 2023-04-26 PROCEDURE — 99215 PR OFFICE/OUTPT VISIT, EST, LEVL V, 40-54 MIN: ICD-10-PCS | Mod: 95,,, | Performed by: INTERNAL MEDICINE

## 2023-04-26 NOTE — PROGRESS NOTES
IBD PATIENT INTAKE:    COVID symptoms in the last 7 days (runny nose, sore throat, congestion, cough, fever): No  PCP: TESSA MCCOY  If not PCP-  number given to establish [phone]: N/A    ALLERGIES REVIEWED:  Yes    CHIEF COMPLAINT:    Chief Complaint   Patient presents with    Ulcerative Colitis       VITAL SIGNS:  LMP  (LMP Unknown) Comment: Viorele     Change in medical, surgical, family or social history: No    IBD THERAPY (name, dose/frequency):  Stelara q6w Lialda 4.8g   Last dose:  3/8    Next dose:  4/19 missed due to ins   Infusion/Pharmacy: [facility]    NSAIDs (aspirin, ibuprofen-advil or motrin, naproxen-aleve, diclofenac-voltaren, BC powder, excedrin, goodies): No    Alternative/Complementary Medications (i.e. probiotics, turmeric, fish oil, aloe vera):      No  Name/dose:  n/a    Vitamins:   Vit D:  no     Vit B-12:  no   Folic Acid: no       Calcium: no     Iron:  no      MVI: no    Antibiotics (past 30 Days):  No  If yes   Indication:  Name of antibiotic:  Completion date:     REVIEWED MEDICATION LIST RECONCILED INCLUDING ABOVE MEDS:  Yes

## 2023-04-26 NOTE — PATIENT INSTRUCTIONS
- stop stelara  - continue lialda 4 tabs daily  - fecal calprotectin- turn in on 6/1/23 at [facility]  - meet with dietician to discuss modified SCD vs mediterranean diet   - if any tenesmus symptoms start immediately restart canasa and email me   - skin exam yearly- you can do self skin exams-  [email or web address]   - schedule visit with dietician and BECKA on same day

## 2023-04-26 NOTE — PROGRESS NOTES
[facility]             Inflammatory Bowel Disease   Follow-up  Note              TODAY'S VISIT DATE:  2023    Chief Complaint:   Chief Complaint   Patient presents with    Ulcerative Colitis     PCP: TESSA MCCOY    Previous History:  Lucía Jewell is a 37 y.o.ulcerative colitis (proctitis), IBS, family history of celiac disease. In  she had constipation and underwent barium enema and after this rectal bleeding started.  In approximately  she had a colonoscopy in [facility] and she was diagnosed with ulcerative proctitis and treated with proctofoam 1-2 mos and this helped. From [id number] she had intermittent constipation and blood in stool and treated this with miralax. In 2004 she started with rectal bleeding and colonoscopy showed distal 10 cm of rectum with granularity and friability and multiple erosions with remainder of colon and TI normal.  She was diagnosed with ulcerative proctitis and treated with asacol 400 mg BID which was optimized to 6.4 g/d but the number of pills/frequency was difficult at her age and she was only taking about 1/2 the dose recommended. She moved to [facility] from [place] in  and from [id number] and when she met Dr. Alston due to difficult taking medicine asacol was discontinued and she was started on lialda 2.4 g/d. When she was on asacol from 7202-4802 symptoms would improve with flares a few times/year (constipation and rectal bleeding) and these were treated with 2 courses of prednisone, rowasa enemas and canasa suppositories (not compliant but it helped when she took it). At times she was also given miralax for constipation sx that seemed to precipitate the rectal bleeding.  While on lialda 2.4 g/day she was compliant with medicines though continued with flares 2-3 times/year treated miralax and had about once a year would take short course of prednisone. Patient delivered a healthy baby girl by  13 and during  pregnancy she felt well and was compliant lialda 2.4 g/d.  She saw Dr. Alston 2015 and reported abd pain that improved with defecation, 3-4 formed BMs/d, daily reflux, postprandial bloating.  She was prescribed librax and colonoscopy 9/21/15 showed normal colon with localized continuous erythema with mild granularity and erosions in the rectum to 10 cm. From 9274-1686 she continued on lialda and briefly due to insurance issues in early 2017 she took apriso for a few mos and went back to lialda at higher dose of 4.8 g/d in early summer 2017. In 4/2017 pt then established with Dr. Ott at with time she reported 20 small volumed stool/d with crampy abd pain on apriso 1.5 g/d and rowasa enemas added to her treatment. Flex sig 4/24/17 significant for diffuse granularity, friability, patchy ulcerations with loss of normal vascularity cw UC Jenkins score 2-3 and biopsies of rectum c/w chronic active proctitis with crypt abscesses. She took a short course of prednisone and transitioned to lialda 4.8 g/d and continued on rowasa qhs (compliant with 2 weeks). The combination of prednisone, lialda and rowasa enemas and she feels that the prednisone helped her retain the enemas.  In 11/2017 pt had some intermittent bleeding and patient asked to decrease lialda 2.4 g/d and rowasa enemas qhs.  Flex sig 5/11/18 significant for right at the anal verge in the most distal 1-2 cm faint granularity with mild loss of vascularity c/w Jenkins 1 disease. From [id number] she was on intermittent courses rowasa enemas for 2 weeks and this would help when able to hold it in. By 10/2018 she reported if she stopped or skipped dose of rowasa enemas her symptoms would return and she continued on lialda 2.4 g/d and she was advised at this visit to start canasa suppositories qhs, decrease rowasa enemas to once a week, continue lialda 2.4 g/d. Due to ongoing symptoms she started Humira 12/2018 with good response and pt wanted to stop lialda by early 2019.  In 4/2020 due to dyspepsia she was placed on PPI and underwent US 10/2020 for RUQ abd pain which was normal with normal GB EF. She then continued on monotherapy with humira 40 mg SC q 2 weeks.  In approximately 11/2021 pt developed rectal bleeding and started on prednisone course.  On 12/3/21 pt had trough humira levels 15.6/Abs neg. Flex sig 1/31/22 showed circumferential erythema, grnularrity with loss of vascularity and focal punctate ulcers starting 10 cm from anus with biopsies of rectum c/w CMV neg moderately active proctitis and rectosigmoid colon bx normal. In 11/2021 she restarted lialda 4.8 g/d (good adherent). Due to continued symptoms Dr. Ott attempted to get weekly humira approved though this was denied by her insurance so on 3/22/22 she started on stelara with prednisone taper (stopped in 5/2022). Currently patient reports about 40 trips to the toilet of which 35 of those trips (2 trips per hour) are during the day and 5 trips nocturnal b/w hrs of 9pm-5am. Of the total trips to the toilet about 50% are soft to skinny formed stool and remaining are false trips with mucus and blood on toilet paper. Pt has cough, back pain, anxiety/depression. I met her in the IBD clinic on 8/16/22 at which time she was on stelara 90 mg SC q 8 weeks, lialda 4.8 g/d, canasa supp twice a week (urgency and so difficult to take supp) so we proceeded with stool studies to r/o infection and stool calprotectin and advised her to take off of work for 2 weeks and take a course of canasa supp BID. Prior to starting canasa BID she had diarrhea and stool studies neg for infection and suprisingly her stool calprotectin was normal. She took a course of canasa BID from 8/23/22-9/30/22 but then had worsening diarrhea in early 9/2022 and repeat stool calprotectin 9/2022 273.  Meanwhile she continued on stelara every 8 weeks and lialda 4.8 g/d and started uceris foam BID 10/5/22 but not compliant and was only taking 1-2 times/week. She  continued on stelara and was optimized to q 6 weeks starting 11/2/22 due to trough stelara levels 11/2022 of 3.5/Abs neg and fecal calprotectin normal 11/11/22.      Interval History:  - current IBD meds: Stelara 90 mg SC q 6 weeks (started 3/2022, 90 mg SC q 6 weeks 11/2/22, LD 3/8, ND was due 4/19 but due to insurance ), lialda 4.8 g/d  - tacrolimus supp (qhs 10/28/22, qod 11/30/22, stopped 1/11/23), canasa supp (11/30/22, qod 3/6/23, stopped 3/25/23 on her own)  - On 1/11/23 she stopped tacrolimus suppositories and continued canasa qhs and on 2/27/23 fecal calprotectin normal so on 3/6/23 changed canasa supp qod and was supposed to discontinued 3/25/23 and still doing well.   - 3/7/23 trough stelara 7.6/Abs neg (on stelara q 6 weeks)   - 1-2 soft BMs/d, no blood, no urgency, no nocturnal BMs  - she asked to do complementary/alternative therapy with diet and would prefer to stop stelara  - NSAID use: No  - Narcotic use: No  - Alternative/complementary meds for IBD: No    Prior Pertinent Surgeries:   None    Last pertinent Endoscopy/Imaging:   10/2020 US: normal with normal GB EF.  9/29/22 colonoscopy: The perianal and digital rectal examinations were normal. From anal verge to 20 cm mild to moderately active nflammation characterized by diffuse erythema, few superficial and linear ulcerations. From 20-25 cm there is mild erythema and linear ulcerations. From 25 cm to the cecum the mucosa is normal with good preservation of vasculature. Normal ICV. Biopsies TI normal. Biopsies of cecum/ascending with patchy minimal architectural distortion, transverse colon normal, descending with patchy minimal increase in lamina propria eosinophils, sigmoid normal, rectosigmoid with chronic active colitis with mild cryptitis, rectum with chronic active colitis, severe with ulceration/granulation tissue formation, CMV neg    Therapeutic Drug Monitoring Labs:  12/3/21 trough humira levels 15.6/Abs neg  11/1/22 trough stelara levels  3.5/Abs neg (stelara 90 mg SC q 8 weeks)    Prior IBD Therapies:  Lialda  Rowasa enemas- effective with combination of lialda, rowasa   Canasa suppositories- ineffective, took BID 8/2022-9/2022  Prednisone- effective in past  Humira 40 mg SC q 2 weeks (12/2018-approximately 2/2022)- secondary nonresponder with good levels/neg abs, weekly humira denied though good drug levels  uceris foam BID prescribed though pt initially taking qhs and then only taking 1-2 times/week (10/2022)  tacrolimus supp (qhs 10/28/22, qod 11/30/22, stopped 1/11/23)- effective  canasa supp (11/30/22, qod 3/6/23, stopped 3/25/23 on her own)-effective     Vaccinations:  No results found for: HEPBSAB  Lab Results   Component Value Date    HEPBSURFABQU POSITIVE 08/16/2022    HEPBSURFABQU >1,000 08/16/2022     Lab Results   Component Value Date    HEPAIGG Negative 08/16/2022     Lab Results   Component Value Date    VARICELLAZOS 2.98 (H) 08/16/2022    VARICELLAINT Positive (A) 08/16/2022     Immunization History   Administered Date(s) Administered    Hepatitis A, Adult 03/07/2023    Influenza - Quadrivalent - PF *Preferred* (6 months and older) 11/07/2019    Influenza - Trivalent - PF (ADULT) 11/14/2018    Pneumococcal Conjugate - 20 Valent 03/07/2023     Flu shot: defer   COVID vaccine/booster:  defer   Tetanus (TdaP): recommended   HPV:    NA  Meningococcal: NA  Hepatitis A:  hep A #2 9/5/23  MMR (live vaccine):  immune      Shingrix: recommended after age 51 yo     Review of Systems   Constitutional:  Negative for chills, fever and weight loss.   HENT:          No oral ulcers, dysphagia, oral thrush   Eyes:  Negative for blurred vision, pain and redness.   Respiratory:  Negative for cough and shortness of breath.    Cardiovascular:  Negative for chest pain.   Gastrointestinal:  Negative for abdominal pain, heartburn, nausea and vomiting.   Genitourinary:  Negative for dysuria and hematuria.   Musculoskeletal:  Negative for back pain and joint  pain.   Skin:  Negative for rash.   Psychiatric/Behavioral:  Negative for depression. The patient is not nervous/anxious and does not have insomnia.      All Medical History/Surgical History/Family History/Social History/Allergies have been reviewed and updated in EMR    Review of patient's allergies indicates:  No Known Allergies    Outpatient Medications Marked as Taking for the 4/26/23 encounter (Office Visit) with Zana Saldana MD   Medication Sig Dispense Refill    buPROPion (WELLBUTRIN XL) 300 MG 24 hr tablet Take 1 tablet (300 mg total) by mouth once daily. 30 tablet 5    desog-e.estradioL/e.estradioL (VIORELE, 28,) 0.15-0.02 mgx21 /0.01 mg x 5 per tablet Take 1 tablet by mouth once daily. 84 tablet 0    lamoTRIgine (LAMICTAL) 150 MG Tab Take 150 mg by mouth once daily.      mesalamine (LIALDA) 1.2 gram TbEC Take 4 tablets (4.8 g total) by mouth once daily. 120 tablet 5    ustekinumab (STELARA) 90 mg/mL Syrg syringe 90 mg every 6 weeks 1 each 3     Vital Signs:  LMP  (LMP Unknown) Comment: Viorele     Physical Exam    Labs:   Lab Results   Component Value Date    CRP 2.4 08/16/2022    CALPROTECTIN <27.1 08/18/2022     Lab Results   Component Value Date    HEPBSAG Negative 08/16/2022    HEPBCAB Negative 08/16/2022     Lab Results   Component Value Date    TBGOLDPLUS Negative 08/16/2022     Lab Results   Component Value Date    HLWOPZGY31DQ 40 08/16/2022    YGHCZEES21 459 08/16/2022     Lab Results   Component Value Date    WBC 7.10 03/07/2023    HGB 12.5 03/07/2023    HCT 38.5 03/07/2023    MCV 93 03/07/2023     03/07/2023     Lab Results   Component Value Date    CREATININE 0.9 03/07/2023    ALBUMIN 4.0 03/07/2023    BILITOT 0.4 03/07/2023    ALKPHOS 65 03/07/2023    AST 19 03/07/2023    ALT 13 03/07/2023     Assessment/Plan:  Lucía Jewell is a 37 y.o. female with ulcerative colitis (proctitis), IBS, family history of celiac disease.     She is doing well and has tapered off of canasa  suppositories though discontinued 1 month earlier than we recommended but doing well. She continues lialda 4.8 g/d.  She has been on stelara q 6 weeks with therapeutic drug levels but I feel that this was not what put her in remission and the suppositories aminta tacrolimus was the most helpful.  She is now off of suppositories on lialda and we will discontinue stelara. She is in remission with fecal calprotectin and symptoms and we will in future repeat colonoscopy. She will alert us of any early onset of tenesmus symptoms and we emphasized importance of this so we can get things controlled.  We will monitor her closely with fecal calprotectin with repeat 6/1/23 and then q 6 mos if normal. She will meet with our dietician to discuss modified SCD vs mediterranean as a supplementary treatment     # Ulcerative colitis (proctitis)    - continue lialda 4.8 g/d  - stop stelara   - colonoscopy 9/2024  - dietician- will meet with our dietician to discuss modified SCD vs mediterranean as a supplementary treatment, pt is dietician herself  - pt to let us know immediately of any proctitis symptoms  - stool calprotectin 6/1/23 and if normal then repeat q 6 mos  - drug monitoring labs: CBC/CMP q 6 mos (3/7/2023), TPMT (normal 8/2022), TB quantiferon (neg 8/2022), Hep B testing (HBsAg, HBtotalcoreAb neg 8/2022)    # IBD specific health maintenance:  CRC risk: average risk, proctitis  Skin exam yearly   Risk for osteopenia/osteoporosis- none  Pap smear- per GYN  Vitamin D- normal, no RFs, not on supplementation  Vaccines: no live vaccines, hep A #2 9/5/23    Follow up: 2 mos with BECKA and dietician    The patient location is: home  The chief complaint leading to consultation is: ulcerative colitis     Visit type: audiovisual    Face to Face time with patient: 20 minutes  40 minutes of total time spent on the encounter, which includes face to face time and non-face to face time preparing to see the patient (eg, review of tests),  Obtaining and/or reviewing separately obtained history, Documenting clinical information in the electronic or other health record, Independently interpreting results (not separately reported) and communicating results to the patient/family/caregiver, or Care coordination (not separately reported).     Each patient to whom he or she provides medical services by telemedicine is:  (1) informed of the relationship between the physician and patient and the respective role of any other health care provider with respect to management of the patient; and (2) notified that he or she may decline to receive medical services by telemedicine and may withdraw from such care at any time.    Zana Saldana MD  Department of Gastroenterology  Medical Director, Inflammatory Bowel Disease

## 2023-06-02 ENCOUNTER — PATIENT MESSAGE (OUTPATIENT)
Dept: GASTROENTEROLOGY | Facility: CLINIC | Age: 38
End: 2023-06-02
Payer: COMMERCIAL

## 2023-06-11 LAB — CALPROTECTIN STL-MCNT: <5 UG/G (ref 0–120)

## 2023-06-16 NOTE — PROGRESS NOTES
[facility]             Inflammatory Bowel Disease   Follow-up  Note              TODAY'S VISIT DATE:  2023    Chief Complaint:   Chief Complaint   Patient presents with    Ulcerative Colitis     PCP: TESSA MCCOY    Previous History:  Lucía Jewell is a 38 y.o.ulcerative colitis (proctitis), IBS, family history of celiac disease. In  she had constipation and underwent barium enema and after this rectal bleeding started.  In approximately  she had a colonoscopy in [facility] and she was diagnosed with ulcerative proctitis and treated with proctofoam 1-2 mos and this helped. From 9980-5860 she had intermittent constipation and blood in stool and treated this with miralax. In 2004 she started with rectal bleeding and colonoscopy showed distal 10 cm of rectum with granularity and friability and multiple erosions with remainder of colon and TI normal.  She was diagnosed with ulcerative proctitis and treated with asacol 400 mg BID which was optimized to 6.4 g/d but the number of pills/frequency was difficult at her age and she was only taking about 1/2 the dose recommended. She moved to [facility] from [place] in  and from 1617-8615 and when she met Dr. Alston due to difficult taking medicine asacol was discontinued and she was started on lialda 2.4 g/d. When she was on asacol from 1932-1503 symptoms would improve with flares a few times/year (constipation and rectal bleeding) and these were treated with 2 courses of prednisone, rowasa enemas and canasa suppositories (not compliant but it helped when she took it). At times she was also given miralax for constipation sx that seemed to precipitate the rectal bleeding.  While on lialda 2.4 g/day she was compliant with medicines though continued with flares 2-3 times/year treated miralax and had about once a year would take short course of prednisone. Patient delivered a healthy baby girl by  13 and during  pregnancy she felt well and was compliant lialda 2.4 g/d.  She saw Dr. Alston 2015 and reported abd pain that improved with defecation, 3-4 formed BMs/d, daily reflux, postprandial bloating.  She was prescribed librax and colonoscopy 9/21/15 showed normal colon with localized continuous erythema with mild granularity and erosions in the rectum to 10 cm. From 4780-4788 she continued on lialda and briefly due to insurance issues in early 2017 she took apriso for a few mos and went back to lialda at higher dose of 4.8 g/d in early summer 2017. In 4/2017 pt then established with Dr. Ott at with time she reported 20 small volumed stool/d with crampy abd pain on apriso 1.5 g/d and rowasa enemas added to her treatment. Flex sig 4/24/17 significant for diffuse granularity, friability, patchy ulcerations with loss of normal vascularity cw UC Jenkins score 2-3 and biopsies of rectum c/w chronic active proctitis with crypt abscesses. She took a short course of prednisone and transitioned to lialda 4.8 g/d and continued on rowasa qhs (compliant with 2 weeks). The combination of prednisone, lialda and rowasa enemas and she feels that the prednisone helped her retain the enemas.  In 11/2017 pt had some intermittent bleeding and patient asked to decrease lialda 2.4 g/d and rowasa enemas qhs.  Flex sig 5/11/18 significant for right at the anal verge in the most distal 1-2 cm faint granularity with mild loss of vascularity c/w Jenkins 1 disease. From 2188-4125 she was on intermittent courses rowasa enemas for 2 weeks and this would help when able to hold it in. By 10/2018 she reported if she stopped or skipped dose of rowasa enemas her symptoms would return and she continued on lialda 2.4 g/d and she was advised at this visit to start canasa suppositories qhs, decrease rowasa enemas to once a week, continue lialda 2.4 g/d. Due to ongoing symptoms she started Humira 12/2018 with good response and pt wanted to stop lialda by early 2019.  In 4/2020 due to dyspepsia she was placed on PPI and underwent US 10/2020 for RUQ abd pain which was normal with normal GB EF. She then continued on monotherapy with humira 40 mg SC q 2 weeks.  In approximately 11/2021 pt developed rectal bleeding and started on prednisone course.  On 12/3/21 pt had trough humira levels 15.6/Abs neg. Flex sig 1/31/22 showed circumferential erythema, grnularrity with loss of vascularity and focal punctate ulcers starting 10 cm from anus with biopsies of rectum c/w CMV neg moderately active proctitis and rectosigmoid colon bx normal. In 11/2021 she restarted lialda 4.8 g/d (good adherent). Due to continued symptoms Dr. Ott attempted to get weekly humira approved though this was denied by her insurance so on 3/22/22 she started on stelara with prednisone taper (stopped in 5/2022). Currently patient reports about 40 trips to the toilet of which 35 of those trips (2 trips per hour) are during the day and 5 trips nocturnal b/w hrs of 9pm-5am. Of the total trips to the toilet about 50% are soft to skinny formed stool and remaining are false trips with mucus and blood on toilet paper. Pt has cough, back pain, anxiety/depression. I met her in the IBD clinic on 8/16/22 at which time she was on stelara 90 mg SC q 8 weeks, lialda 4.8 g/d, canasa supp twice a week (urgency and so difficult to take supp) so we proceeded with stool studies to r/o infection and stool calprotectin and advised her to take off of work for 2 weeks and take a course of canasa supp BID. Prior to starting canasa BID she had diarrhea and stool studies neg for infection and suprisingly her stool calprotectin was normal. She took a course of canasa BID from 8/23/22-9/30/22 but then had worsening diarrhea in early 9/2022 and repeat stool calprotectin 9/2022 273.  Meanwhile she continued on stelara every 8 weeks and lialda 4.8 g/d and started uceris foam BID 10/5/22 but not compliant and was only taking 1-2 times/week. She  continued on stelara and was optimized to q 6 weeks starting 11/2/22 due to trough stelara levels 11/2022 of 3.5/Abs neg and fecal calprotectin normal 11/11/22. Previously in April patient had some insurance issues and her stelara was delayed ? weeks/days. On 1/11/23 she stopped tacrolimus suppositories and continued canasa qhs and on 2/27/23 fecal calprotectin normal so on 3/6/23 changed canasa supp qod and was supposed to discontinued 3/25/23 and still doing well. 3/7/23 trough stelara 7.6/Abs neg (on stelara q 6 weeks)     Interval History:  - current IBD meds: lialda 4.8 g/d   - Stopped at last visit 4/26/23 - Stelara 90 mg SC q 6 weeks (started 3/2022, 90 mg SC q 6 weeks 11/2/22, stopped 4/26/23)  - tacrolimus supp (qhs 10/28/22, qod 11/30/22, stopped 1/11/23), canasa supp (11/30/22, qod 3/6/23, stopped 3/25/23 on her own)  - 1 soft BMs/d, no blood, no urgency, no nocturnal BMs  - 6/2/23 - Calprotectin <5   - 6/21/23 - met with Katiuska about SCD/Anti-inflammatory diet   - she asked to do complementary/alternative therapy with diet and would prefer to stop stelara  - NSAID use: No  - Narcotic use: No  - Alternative/complementary meds for IBD: No    Prior Pertinent Surgeries:   None    Last pertinent Endoscopy/Imaging:   10/2020 US: normal with normal GB EF.  9/29/22 colonoscopy: The perianal and digital rectal examinations were normal. From anal verge to 20 cm mild to moderately active nflammation characterized by diffuse erythema, few superficial and linear ulcerations. From 20-25 cm there is mild erythema and linear ulcerations. From 25 cm to the cecum the mucosa is normal with good preservation of vasculature. Normal ICV. Biopsies TI normal. Biopsies of cecum/ascending with patchy minimal architectural distortion, transverse colon normal, descending with patchy minimal increase in lamina propria eosinophils, sigmoid normal, rectosigmoid with chronic active colitis with mild cryptitis, rectum with chronic  active colitis, severe with ulceration/granulation tissue formation, CMV neg    Therapeutic Drug Monitoring Labs:  12/3/21 trough humira levels 15.6/Abs neg  11/1/22 trough stelara levels 3.5/Abs neg (stelara 90 mg SC q 8 weeks)  3/7/23 trough stelara 7.6/Abs neg (on stelara 90 mg q 6 weeks)    Prior IBD Therapies:  Lialda  Rowasa enemas- effective with combination of lialda, rowasa   Canasa suppositories- ineffective, took BID 8/2022-9/2022  Prednisone- effective in past  Humira 40 mg SC q 2 weeks (12/2018-approximately 2/2022)- secondary nonresponder with good levels/neg abs, weekly humira denied though good drug levels  uceris foam BID prescribed though pt initially taking qhs and then only taking 1-2 times/week (10/2022)  tacrolimus supp (qhs 10/28/22, qod 11/30/22, stopped 1/11/23)- effective  canasa supp (11/30/22, qod 3/6/23, stopped 3/25/23 on her own)-effective   Stelara 90 mg SC q 6 weeks (started 3/2022, 90 mg SC q 6 weeks 11/2/22, stopped 4/26/23) - pt requested to stop    Vaccinations:  No results found for: HEPBSAB  Lab Results   Component Value Date    HEPBSURFABQU POSITIVE 08/16/2022    HEPBSURFABQU >1,000 08/16/2022     Lab Results   Component Value Date    HEPAIGG Negative 08/16/2022     Lab Results   Component Value Date    VARICELLAZOS 2.98 (H) 08/16/2022    VARICELLAINT Positive (A) 08/16/2022     Immunization History   Administered Date(s) Administered    Hepatitis A, Adult 03/07/2023    Influenza - Quadrivalent - PF *Preferred* (6 months and older) 11/07/2019    Influenza - Trivalent - PF (ADULT) 11/14/2018    Pneumococcal Conjugate - 20 Valent 03/07/2023     Flu shot: defer   COVID vaccine/booster:  defer   Tetanus (TdaP): recommended   HPV:    NA  Meningococcal: NA  Hepatitis A:  hep A #2 9/5/23  MMR (live vaccine):  immune      Shingrix: recommended after age 51 yo     Review of Systems   Constitutional:  Negative for chills, fever and weight loss.   HENT:          No oral ulcers,  dysphagia, oral thrush   Eyes:  Negative for blurred vision, pain and redness.   Respiratory:  Negative for cough and shortness of breath.    Cardiovascular:  Negative for chest pain.   Gastrointestinal:  Negative for abdominal pain, blood in stool, heartburn, nausea and vomiting.   Genitourinary:  Negative for dysuria and hematuria.   Musculoskeletal:  Negative for back pain and joint pain.   Skin:  Negative for rash.   Psychiatric/Behavioral:  Negative for depression. The patient is not nervous/anxious and does not have insomnia.      All Medical History/Surgical History/Family History/Social History/Allergies have been reviewed and updated in EMR    Review of patient's allergies indicates:  No Known Allergies    Outpatient Medications Marked as Taking for the 6/21/23 encounter (Office Visit) with Denia Naranjo NP   Medication Sig Dispense Refill    buPROPion (WELLBUTRIN XL) 300 MG 24 hr tablet Take 1 tablet (300 mg total) by mouth once daily. 30 tablet 5    desog-e.estradioL/e.estradioL (VIORELE, 28,) 0.15-0.02 mgx21 /0.01 mg x 5 per tablet Take 1 tablet by mouth once daily. 84 tablet 0    lamoTRIgine (LAMICTAL) 150 MG Tab Take 112 mg by mouth once daily.      mesalamine (LIALDA) 1.2 gram TbEC Take 4 tablets (4.8 g total) by mouth once daily. 120 tablet 5     Vital Signs:  /70 (BP Location: Left arm, Patient Position: Sitting)   Pulse 67   Temp 97.7 °F (36.5 °C)   Wt 54.2 kg (119 lb 7.8 oz)   LMP  (LMP Unknown) Comment: BC  SpO2 100%   BMI 22.58 kg/m²      Physical Exam  Constitutional:       General: She is not in acute distress.     Appearance: Normal appearance. She is normal weight.   Cardiovascular:      Heart sounds: No murmur heard.  Pulmonary:      Effort: Pulmonary effort is normal.   Abdominal:      General: Bowel sounds are normal. There is no distension.      Palpations: Abdomen is soft.      Tenderness: There is no abdominal tenderness.   Neurological:      Mental Status: She is alert.        Labs:   Lab Results   Component Value Date    CRP 2.4 08/16/2022    CALPROTECTIN <27.1 08/18/2022     Lab Results   Component Value Date    HEPBSAG Negative 08/16/2022    HEPBCAB Negative 08/16/2022     Lab Results   Component Value Date    TBGOLDPLUS Negative 08/16/2022     Lab Results   Component Value Date    [id number] 40 08/16/2022    [id number] 459 08/16/2022     Lab Results   Component Value Date    WBC 7.10 03/07/2023    HGB 12.5 03/07/2023    HCT 38.5 03/07/2023    MCV 93 03/07/2023     03/07/2023     Lab Results   Component Value Date    CREATININE 0.9 03/07/2023    ALBUMIN 4.0 03/07/2023    BILITOT 0.4 03/07/2023    ALKPHOS 65 03/07/2023    AST 19 03/07/2023    ALT 13 03/07/2023     Assessment/Plan:  Lucía Jewell is a 38 y.o. female with ulcerative colitis (proctitis), IBS, family history of celiac disease.     She continues to do well having tapered off both canasa (3/2023) and tacrolimus (1/11/23) suppositories and stopping her stelara 4/26/23. She continues on her lialda 4.8/g daily. She continues to be in symptomatic and endoscopic (calprotectin) remission at this time. Her calprotectin level was less than 5 when checked in beginning of June. We will continue to closely monitor her with every 6 month fecal calprotectin levels and she is aware she needs to alert us if she has any change in her symptoms or early onset of tenesmus so that we can get her symptoms under control quickly. She met with our dietician, Katiuska, and will continue a mediterranean diet to help control her UC.     # Ulcerative colitis (proctitis)    - continue lialda 4.8 g/d  - Canasa suppositories given for patient to have on hand  - colonoscopy 9/2024  - Continue mediterranean diet   - pt to let us know immediately of any proctitis symptoms  - stool calprotectin 6/1/23 - <5 - repeat 12/2023  - drug monitoring labs: CBC/CMP yearly (3/2024), TPMT (normal 8/2022), TB quantiferon (neg 8/2022), Hep B testing  (HBsAg, HBtotalcoreAb neg 8/2022)    # IBD specific health maintenance:  CRC risk: average risk, proctitis  Skin exam yearly - recommended self skin exams  Risk for osteopenia/osteoporosis- none  Pap smear- per GYN  Vitamin D- normal, no RFs, not on supplementation  Vaccines: no live vaccines, hep A #2 9/5/23    Follow up in about 6 months (around 12/21/2023) for MD Jenifer.     BEV CALZADA , NP  Inflammatory Bowel Disease

## 2023-06-21 ENCOUNTER — OFFICE VISIT (OUTPATIENT)
Dept: GASTROENTEROLOGY | Facility: CLINIC | Age: 38
End: 2023-06-21
Payer: COMMERCIAL

## 2023-06-21 ENCOUNTER — NUTRITION (OUTPATIENT)
Dept: GASTROENTEROLOGY | Facility: CLINIC | Age: 38
End: 2023-06-21
Payer: COMMERCIAL

## 2023-06-21 VITALS
BODY MASS INDEX: 22.58 KG/M2 | HEART RATE: 67 BPM | DIASTOLIC BLOOD PRESSURE: 70 MMHG | TEMPERATURE: 98 F | WEIGHT: 119.5 LBS | OXYGEN SATURATION: 100 % | SYSTOLIC BLOOD PRESSURE: 102 MMHG

## 2023-06-21 DIAGNOSIS — Z71.9 ENCOUNTER FOR HEALTH EDUCATION: Primary | ICD-10-CM

## 2023-06-21 DIAGNOSIS — K51.218 ULCERATIVE PROCTITIS WITH OTHER COMPLICATION: Primary | ICD-10-CM

## 2023-06-21 PROCEDURE — 99214 PR OFFICE/OUTPT VISIT, EST, LEVL IV, 30-39 MIN: ICD-10-PCS | Mod: S$GLB,,,

## 2023-06-21 PROCEDURE — 99214 OFFICE O/P EST MOD 30 MIN: CPT | Mod: S$GLB,,,

## 2023-06-21 PROCEDURE — 1160F PR REVIEW ALL MEDS BY PRESCRIBER/CLIN PHARMACIST DOCUMENTED: ICD-10-PCS | Mod: CPTII,S$GLB,,

## 2023-06-21 PROCEDURE — 1159F PR MEDICATION LIST DOCUMENTED IN MEDICAL RECORD: ICD-10-PCS | Mod: CPTII,S$GLB,,

## 2023-06-21 PROCEDURE — 3078F PR MOST RECENT DIASTOLIC BLOOD PRESSURE < 80 MM HG: ICD-10-PCS | Mod: CPTII,S$GLB,,

## 2023-06-21 PROCEDURE — 3008F BODY MASS INDEX DOCD: CPT | Mod: CPTII,S$GLB,,

## 2023-06-21 PROCEDURE — 3074F SYST BP LT 130 MM HG: CPT | Mod: CPTII,S$GLB,,

## 2023-06-21 PROCEDURE — 3078F DIAST BP <80 MM HG: CPT | Mod: CPTII,S$GLB,,

## 2023-06-21 PROCEDURE — 1159F MED LIST DOCD IN RCRD: CPT | Mod: CPTII,S$GLB,,

## 2023-06-21 PROCEDURE — 3074F PR MOST RECENT SYSTOLIC BLOOD PRESSURE < 130 MM HG: ICD-10-PCS | Mod: CPTII,S$GLB,,

## 2023-06-21 PROCEDURE — 3008F PR BODY MASS INDEX (BMI) DOCUMENTED: ICD-10-PCS | Mod: CPTII,S$GLB,,

## 2023-06-21 PROCEDURE — 1160F RVW MEDS BY RX/DR IN RCRD: CPT | Mod: CPTII,S$GLB,,

## 2023-06-21 RX ORDER — MESALAMINE 1000 MG/1
1000 SUPPOSITORY RECTAL NIGHTLY
Qty: 30 SUPPOSITORY | Refills: 5 | Status: SHIPPED | OUTPATIENT
Start: 2023-06-21 | End: 2023-09-20 | Stop reason: SDUPTHER

## 2023-06-21 NOTE — PROGRESS NOTES
"Referring Provider: Zana Saldana MD  Reason for Nutrition Referral: Nutrition education for Modified Specific Carbohydrate Diet Vs. Mediterranean diet     Patient Information: Lucía Jewell 38 y.o.-year-old White female   Nutrition-related concerns: Pt presents for nutrition education for the Modified SCD Vs. the Mediterranean diet in the face of IBD - UC  Follows a Pescatarian diet      A = Nutrition Assessment  Anthropometric Data Estimated body mass index is 22.11 kg/m² as calculated from the following:    Height as of 11/7/22: 5' 1" (1.549 m).    Weight as of 11/7/22: 53.1 kg (117 lb).  Ideal Body Weight (IBW): 105lbs (47.7kg); 111%IBW   Last 3 Weights:   Wt Readings from Last 3 Encounters:   11/07/22 53.1 kg (117 lb)   10/07/22 52.6 kg (115 lb 15.4 oz)   09/29/22 51.7 kg (114 lb)     Relevant Wt hx: Pt weight has remained stable   Biochemical Data Labs:   Lab Results   Component Value Date    [id number] 40 08/16/2022    [id number] 459 08/16/2022     Lab Results   Component Value Date    HGB 12.5 03/07/2023    HCT 38.5 03/07/2023     Lab Results   Component Value Date    ALBUMIN 4.0 03/07/2023     No results found for: HGBA1C  Meds:reviewed   Dietary Data  Appetite: Good  Fairlife lactose-free milk   Chobani low-fat yogurt    Overall impression of dietary recall:   Pt states she enjoys eating a variety of foods from all food groups. She is Pescatarian based on preference.    Other Data:                    Food Allergies/intolerances: none  Dx: IBD - Ulcerative Proctitis     D = Nutrition Diagnosis   Patient Assessment: Patient is at increased nutrition risk due to dx of IBD and need for diet education to manage symptoms and ensure adequate nutrition.    Patient knowledge of healthy eating and exercise patterns was assessed to be good. 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      Session was spent educating patient on the Modified Specific Carbohydrate Diet vs. Mediterranean Diet. Emphasis " placed on Foods Recommended and Foods to Avoid/Foods to Limit on each diet (Lists given on handouts).     Pt seems most interested in the Mediterranean Diet due to her current food preferences and lifestyle.   Reviewed strategies for choosing and consuming healthy, well-balanced meals as well as snacks regularly that are within the Mediterranean diet plan (increased intake of plant-based proteins (beans and lentils), fish, produce, low-fat dairy).    Recommended the website MedInsteadOfMeds.com for Mediterranean diet recipes and further diet information .     Patient verbalized understanding.  Provided RD contact information for concerns or questions.  Expect good compliance with dietary recommendations at this time.     Primary Problem: Food and nutrition related knowledge deficit   Etiology: Related to lack of nutrition-related education for MSCD vs. Mediterranean Diet  Signs/symptoms: As evidenced by no prior knowledge of need for nutrition-related recommendations .     Education Materials provided:   1.  [employer]: The SCD/MSCD  Patient Handout  2.  Produce Study: SCD Summary  3.  Academy of Nutrition and Dietetics/NCM: General, Healthful Mediterranean Nutrition Therapy  4.  Website Recommendations: [employer]     I = Nutrition Intervention  Recommendation #1 Eat small, balanced meals and snacks every 3-4 hours. Do not skip meals.   Recommendation #2 Incorporate 5 servings of fruits/vegetables into daily eating pattern   Recommendation #3 Choose plant-based proteins and fish more often.    Recommendation #4 Choose anti-inflammatory, plant-based fats more often. Limit sources of saturated fats in overall diet. Focus on meeting recommended intake of Omega-3 Fatty Acids (300mg/day) through food sources of with a supplement.     M = Nutrition Monitoring   Indicator 1. Diet recall   Indicator 2. Weight     E= Nutrition Evaluation   Goal 1. Diet recall shows good compliance with recommendations reviewed during  session    Goal 2. Weight shows trend towards goal of weight maintenance      Consultation Time: 50 Minutes  Follow Up: Patient provided with Dietitian contact number and advised to call or make future appointment if further consultation is needed/desired.    Communication with provider via Epic  Signature: Katiuska Bahena, MPH, RDN, LDN

## 2023-06-21 NOTE — PATIENT INSTRUCTIONS
https://www.aad.org/public/diseases/skin-cancer/find/check-skin  - skin exam yearly  - calprotectin in 6 months (12/2023) at [facility]  - Continue lialda 4.8g/d  - labs yearly   - follow up in 6 months with Dr Saldana

## 2023-06-21 NOTE — PROGRESS NOTES
IBD PATIENT INTAKE:    COVID symptoms in the last 7 days (runny nose, sore throat, congestion, cough, fever): No  PCP: TESSA MCCOY  If not PCP-  number given to establish [phone]: N/A    ALLERGIES REVIEWED:  Yes    CHIEF COMPLAINT:    Chief Complaint   Patient presents with    Ulcerative Colitis       VITAL SIGNS:  /70 (BP Location: Left arm, Patient Position: Sitting)   Pulse 67   Temp 97.7 °F (36.5 °C)   Wt 54.2 kg (119 lb 7.8 oz)   LMP  (LMP Unknown) Comment: BC  SpO2 100%   BMI 22.58 kg/m²      Change in medical, surgical, family or social history: No    IBD THERAPY (name, dose/frequency):  Lialda 4.8g  Last dose:  n/a    Next dose:  n/a  Infusion/Pharmacy: NA    Vitamins (be sure this has been put in medication list):   Vit D:  no     Vit B-12:  no   Folic Acid: no       Calcium: no     Iron:  no      MVI: no    Antibiotics (past 30 Days):  No  If yes   Indication:  Name of antibiotic:  Completion date:     REVIEWED MEDICATION LIST RECONCILED INCLUDING ABOVE MEDS:  Yes

## 2023-06-21 NOTE — LETTER
June 21, 2023        Zana Saldana MD  [address]  [facility] [address]             [facility]  [address]  [place] [address]  Phone: [phone]  Fax: [phone]   Patient: Lucía Jewell   MR Number: [id number]   YOB: 1985   Date of Visit: 6/21/2023       Dear Dr. Saldana:    Thank you for referring Lucía Jewell to me for evaluation. Below are the relevant portions of my assessment and plan of care.            If you have questions, please do not hesitate to call me. I look forward to following Lucía along with you.    Sincerely,      Denia Naranjo, FIDEL           CC    No Recipients

## 2023-10-04 DIAGNOSIS — K51.218 ULCERATIVE PROCTITIS WITH OTHER COMPLICATION: ICD-10-CM

## 2023-10-04 RX ORDER — MESALAMINE 1.2 G/1
4.8 TABLET, DELAYED RELEASE ORAL
Qty: 120 TABLET | Refills: 3 | Status: SHIPPED | OUTPATIENT
Start: 2023-10-04 | End: 2024-02-19

## 2023-10-04 NOTE — TELEPHONE ENCOUNTER
IBD medications Lialda 1.2 grams Take 4 tablets ( 4.8 Grams) QD    Refill request for  Lialda 1.2 grams Take 4 tablets ( 4.8 Grams) QD    Allergies reviewed Yes    Drug Monitoring labs/frequency for all IBD meds:    CBC/ CMP yearly    Lab Results   Component Value Date    HEPBSAG Negative 08/16/2022    HEPBCAB Negative 08/16/2022     Lab Results   Component Value Date    TBGOLDPLUS Negative 08/16/2022     Lab Results   Component Value Date    [id number] 40 08/16/2022    [id number] 459 08/16/2022     Lab Results   Component Value Date    WBC 7.10 03/07/2023    HGB 12.5 03/07/2023    HCT 38.5 03/07/2023    MCV 93 03/07/2023     03/07/2023     Lab Results   Component Value Date    CREATININE 0.9 03/07/2023    ALBUMIN 4.0 03/07/2023    BILITOT 0.4 03/07/2023    ALKPHOS 65 03/07/2023    AST 19 03/07/2023    ALT 13 03/07/2023       Lab due date (mo/yr):  3/2024    Labs scheduled: No    Next appt: 12/6/2023    RX refill sent to provider for amount until next labs: Yes

## 2023-11-18 ENCOUNTER — PATIENT MESSAGE (OUTPATIENT)
Dept: GASTROENTEROLOGY | Facility: CLINIC | Age: 38
End: 2023-11-18
Payer: COMMERCIAL

## 2023-11-18 DIAGNOSIS — K51.218 ULCERATIVE PROCTITIS WITH OTHER COMPLICATION: Primary | ICD-10-CM

## 2023-11-20 RX ORDER — MESALAMINE 1000 MG/1
1000 SUPPOSITORY RECTAL NIGHTLY
Qty: 30 SUPPOSITORY | Refills: 1 | Status: SHIPPED | OUTPATIENT
Start: 2023-11-20 | End: 2023-12-01

## 2023-11-20 NOTE — TELEPHONE ENCOUNTER
Spoke with Lucía:  IBD Meds:   - Lialda 4.8 g/d    - When did your current symptoms start: about a week ago  - #BM in 24 hrs: 2  - consistency/color: mush/dark almost black 11/18 but has turned to soft/formed brown  - blood present: 11/18 yes, dark red when mush stool, bright red blood on TP,  none since  - mucous present: 11/18 none since  - BM urge that wakes you from sleep: no  - # false trips to the bathroom: no  - Pain: no  - Fever or shaking chills: no  - Do you have an explanation for current symptoms? No  - Pt feels well now, no further sxs  - denies NSAID use    Will review with Dr. Saldana for her recommendation.   - has used Canasa and Tacrolimus in the past, doesn't have any on hand

## 2023-11-27 ENCOUNTER — TELEPHONE (OUTPATIENT)
Dept: GASTROENTEROLOGY | Facility: CLINIC | Age: 38
End: 2023-11-27
Payer: COMMERCIAL

## 2023-11-27 NOTE — TELEPHONE ENCOUNTER
----- Message from Erinn Napoles RN sent at 11/20/2023 11:03 AM CST -----  1 wk RN check, did she turn in stool?

## 2023-11-27 NOTE — TELEPHONE ENCOUNTER
Spoke with Lucía:  IBD meds:   Lialda 1.2 G/d  Canasa qhs    She reports stool is soft, formed, brown. Sometimes sees a little blood in the toilet but none on TP. Denies mucous, noc BM, false trips, pain, or fever. Confirmed stool was turned in to [facility] on 11/21, still pending, expect results per [facility] by 12/1. She will continue Canasa until stool remedios results known.

## 2023-11-28 ENCOUNTER — TELEPHONE (OUTPATIENT)
Dept: GASTROENTEROLOGY | Facility: CLINIC | Age: 38
End: 2023-11-28
Payer: COMMERCIAL

## 2023-11-28 LAB — CALPROTECTIN STL-MCNT: 27 UG/G (ref 0–120)

## 2023-11-28 NOTE — TELEPHONE ENCOUNTER
----- Message from Zana Saldana MD sent at 11/28/2023  3:48 PM CST -----  Her stool calprotectin is normal and reassuring.   Get symptom update and see how long she has been on canasa and then we will advise further.

## 2023-11-28 NOTE — TELEPHONE ENCOUNTER
"Spoke with Lucía:  IBD meds:  - Lialda 4.8g/d  - Canasa PRN    - states she's had 8 BM today, formed, brown  - 1st BM of day (for last couple weeks) dark red color on stool, none on TP. No further the rest of the day.  - denies mucous, noc BM, false trips, fever, pain  - she used Canasa for "a couple days" then stopped, didn't like how it made her feel the next day - bloated and gassy    Dr. Saldana will be updated for recommendation.   "

## 2023-12-01 ENCOUNTER — OFFICE VISIT (OUTPATIENT)
Dept: GASTROENTEROLOGY | Facility: CLINIC | Age: 38
End: 2023-12-01
Payer: COMMERCIAL

## 2023-12-01 ENCOUNTER — TELEPHONE (OUTPATIENT)
Dept: GASTROENTEROLOGY | Facility: CLINIC | Age: 38
End: 2023-12-01
Payer: COMMERCIAL

## 2023-12-01 DIAGNOSIS — K51.218 ULCERATIVE PROCTITIS WITH OTHER COMPLICATION: Primary | ICD-10-CM

## 2023-12-01 PROCEDURE — 1160F RVW MEDS BY RX/DR IN RCRD: CPT | Mod: CPTII,95,, | Performed by: INTERNAL MEDICINE

## 2023-12-01 PROCEDURE — 1159F PR MEDICATION LIST DOCUMENTED IN MEDICAL RECORD: ICD-10-PCS | Mod: CPTII,95,, | Performed by: INTERNAL MEDICINE

## 2023-12-01 PROCEDURE — 99215 PR OFFICE/OUTPT VISIT, EST, LEVL V, 40-54 MIN: ICD-10-PCS | Mod: 95,,, | Performed by: INTERNAL MEDICINE

## 2023-12-01 PROCEDURE — 1160F PR REVIEW ALL MEDS BY PRESCRIBER/CLIN PHARMACIST DOCUMENTED: ICD-10-PCS | Mod: CPTII,95,, | Performed by: INTERNAL MEDICINE

## 2023-12-01 PROCEDURE — 99215 OFFICE O/P EST HI 40 MIN: CPT | Mod: 95,,, | Performed by: INTERNAL MEDICINE

## 2023-12-01 PROCEDURE — 1159F MED LIST DOCD IN RCRD: CPT | Mod: CPTII,95,, | Performed by: INTERNAL MEDICINE

## 2023-12-01 NOTE — PATIENT INSTRUCTIONS
- 3/2024 stool calprotectin   - try imodium AD and take 1 pill when you know its a bad day  - hep A #2- (havrix)- email me when you get it   - tetanus if you have not had one in 10 years

## 2023-12-01 NOTE — PROGRESS NOTES
[facility]             Inflammatory Bowel Disease   Follow-up  Note              TODAY'S VISIT DATE:  2023    Chief Complaint:   Chief Complaint   Patient presents with    Ulcerative Colitis     PCP: Leann Davis    Previous History:  Lucía Jewell is a 38 y.o.ulcerative colitis (proctitis), IBS, family history of celiac disease. In  she had constipation and underwent barium enema and after this rectal bleeding started.  In approximately  she had a colonoscopy in [facility] and she was diagnosed with ulcerative proctitis and treated with proctofoam 1-2 mos and this helped. From 7641-3513 she had intermittent constipation and blood in stool and treated this with miralax. In 2004 she started with rectal bleeding and colonoscopy showed distal 10 cm of rectum with granularity and friability and multiple erosions with remainder of colon and TI normal.  She was diagnosed with ulcerative proctitis and treated with asacol 400 mg BID which was optimized to 6.4 g/d but the number of pills/frequency was difficult at her age and she was only taking about 1/2 the dose recommended. She moved to [facility] from [place] in  and from 5491-6034 and when she met Dr. Alston due to difficult taking medicine asacol was discontinued and she was started on lialda 2.4 g/d. When she was on asacol from 9372-2912 symptoms would improve with flares a few times/year (constipation and rectal bleeding) and these were treated with 2 courses of prednisone, rowasa enemas and canasa suppositories (not compliant but it helped when she took it). At times she was also given miralax for constipation sx that seemed to precipitate the rectal bleeding.  While on lialda 2.4 g/day she was compliant with medicines though continued with flares 2-3 times/year treated miralax and had about once a year would take short course of prednisone. Patient delivered a healthy baby girl by  13 and during  pregnancy she felt well and was compliant lialda 2.4 g/d.  She saw Dr. Alston 2015 and reported abd pain that improved with defecation, 3-4 formed BMs/d, daily reflux, postprandial bloating.  She was prescribed librax and colonoscopy 9/21/15 showed normal colon with localized continuous erythema with mild granularity and erosions in the rectum to 10 cm. From 2304-7675 she continued on lialda and briefly due to insurance issues in early 2017 she took apriso for a few mos and went back to lialda at higher dose of 4.8 g/d in early summer 2017. In 4/2017 pt then established with Dr. Ott at with time she reported 20 small volumed stool/d with crampy abd pain on apriso 1.5 g/d and rowasa enemas added to her treatment. Flex sig 4/24/17 significant for diffuse granularity, friability, patchy ulcerations with loss of normal vascularity cw UC Jenkins score 2-3 and biopsies of rectum c/w chronic active proctitis with crypt abscesses. She took a short course of prednisone and transitioned to lialda 4.8 g/d and continued on rowasa qhs (compliant with 2 weeks). The combination of prednisone, lialda and rowasa enemas and she feels that the prednisone helped her retain the enemas.  In 11/2017 pt had some intermittent bleeding and patient asked to decrease lialda 2.4 g/d and rowasa enemas qhs.  Flex sig 5/11/18 significant for right at the anal verge in the most distal 1-2 cm faint granularity with mild loss of vascularity c/w Jenkins 1 disease. From 6089-8600 she was on intermittent courses rowasa enemas for 2 weeks and this would help when able to hold it in. By 10/2018 she reported if she stopped or skipped dose of rowasa enemas her symptoms would return and she continued on lialda 2.4 g/d and she was advised at this visit to start canasa suppositories qhs, decrease rowasa enemas to once a week, continue lialda 2.4 g/d. Due to ongoing symptoms she started Humira 12/2018 with good response and pt wanted to stop lialda by early 2019.  In 4/2020 due to dyspepsia she was placed on PPI and underwent US 10/2020 for RUQ abd pain which was normal with normal GB EF. She then continued on monotherapy with humira 40 mg SC q 2 weeks.  In approximately 11/2021 pt developed rectal bleeding and started on prednisone course.  On 12/3/21 pt had trough humira levels 15.6/Abs neg. Flex sig 1/31/22 showed circumferential erythema, grnularrity with loss of vascularity and focal punctate ulcers starting 10 cm from anus with biopsies of rectum c/w CMV neg moderately active proctitis and rectosigmoid colon bx normal. In 11/2021 she restarted lialda 4.8 g/d (good adherent). Due to continued symptoms Dr. Ott attempted to get weekly humira approved though this was denied by her insurance so on 3/22/22 she started on stelara with prednisone taper (stopped in 5/2022). Currently patient reports about 40 trips to the toilet of which 35 of those trips (2 trips per hour) are during the day and 5 trips nocturnal b/w hrs of 9pm-5am. Of the total trips to the toilet about 50% are soft to skinny formed stool and remaining are false trips with mucus and blood on toilet paper. Pt has cough, back pain, anxiety/depression. I met her in the IBD clinic on 8/16/22 at which time she was on stelara 90 mg SC q 8 weeks, lialda 4.8 g/d, canasa supp twice a week (urgency and so difficult to take supp) so we proceeded with stool studies to r/o infection and stool calprotectin and advised her to take off of work for 2 weeks and take a course of canasa supp BID. Prior to starting canasa BID she had diarrhea and stool studies neg for infection and suprisingly her stool calprotectin was normal. She took a course of canasa BID from 8/23/22-9/30/22 but then had worsening diarrhea in early 9/2022 and repeat stool calprotectin 9/2022 273.  Meanwhile she continued on stelara every 8 weeks and lialda 4.8 g/d and started uceris foam BID 10/5/22 but not compliant and was only taking 1-2 times/week. She  continued on stelara and was optimized to q 6 weeks starting 11/2/22 due to trough stelara levels 11/2022 of 3.5/Abs neg and fecal calprotectin normal 11/11/22. Previously in April patient had some insurance issues and her stelara was delayed ? weeks/days. On 1/11/23 she stopped tacrolimus suppositories and continued canasa qhs and on 2/27/23 fecal calprotectin normal so on 3/6/23 changed canasa supp qod and was supposed to continue but discontinued on her own 3/25/23. On 3/7/23 trough stelara 7.6/Abs neg (on stelara q 6 weeks) and due to not ever being better on stelara and being in remission for her ulcerative proctitis on topical therapy we proceeded with discontinuation of stelara 4/26/23.  In 6/2023 she met with dietician regarding SCD/anti-inflammatory diet and her stool calprotectin 6/2/23 normal.     Interval History:  - current IBD meds: lialda 4.8 g/d   - tried weaning off wellbutrin and lamictal 1 month ago had to go back to normal dose  - diet- mediterranean diet- continues   - 6 loose to soft BMs/d of which 0-1 have blood, no nocturnal  - mouth sores  - abdominal pain- cramping  - early 11/2023 noticed 2 soft stool black and then dark red when mush came out along with blood on toilet paper but no further blood following that and notified us on 11/18/23 and we proceeded with stool calprotectin which was normal 11/20/23 and at time of f/u with results on 11/28 pt reported 8 formed BMs/d.  When she first had symptoms until stool calprotectin resulted we recommended canasa suppositories but pt took for 2 days and then stopped due to bloating and gassiness  - she asked to do complementary/alternative therapy with diet and would prefer to stop stelara  - NSAID use: No  - Narcotic use: No  - Alternative/complementary meds for IBD: No    Prior Pertinent Surgeries:   None    Last pertinent Endoscopy/Imaging:   10/2020 US: normal with normal GB EF.  9/29/22 colonoscopy: The perianal and digital rectal  "examinations were normal. From anal verge to 20 cm mild to moderately active nflammation characterized by diffuse erythema, few superficial and linear ulcerations. From 20-25 cm there is mild erythema and linear ulcerations. From 25 cm to the cecum the mucosa is normal with good preservation of vasculature. Normal ICV. Biopsies TI normal. Biopsies of cecum/ascending with patchy minimal architectural distortion, transverse colon normal, descending with patchy minimal increase in lamina propria eosinophils, sigmoid normal, rectosigmoid with chronic active colitis with mild cryptitis, rectum with chronic active colitis, severe with ulceration/granulation tissue formation, CMV neg    Therapeutic Drug Monitoring Labs:  12/3/21 trough humira levels 15.6/Abs neg  11/1/22 trough stelara levels 3.5/Abs neg (stelara 90 mg SC q 8 weeks)  3/7/23 trough stelara 7.6/Abs neg (on stelara 90 mg q 6 weeks)    Prior IBD Therapies:  Lialda  Rowasa enemas- effective with combination of lialda, rowasa   Canasa suppositories- ineffective, took BID 8/2022-9/2022  Prednisone- effective in past  Humira 40 mg SC q 2 weeks (12/2018-approximately 2/2022)- secondary nonresponder with good levels/neg abs, weekly humira denied though good drug levels  uceris foam BID prescribed though pt initially taking qhs and then only taking 1-2 times/week (10/2022)  tacrolimus supp (qhs 10/28/22, qod 11/30/22, stopped 1/11/23)- effective  canasa supp (11/30/22, qod 3/6/23, stopped 3/25/23 on her own)-effective   Stelara 90 mg SC q 6 weeks (started 3/2022, 90 mg SC q 6 weeks 11/2/22, stopped 4/26/23) - pt requested to stop    Vaccinations:  No results found for: "HEPBSAB"  Lab Results   Component Value Date    HEPBSURFABQU POSITIVE 08/16/2022    HEPBSURFABQU >1,000 08/16/2022     Lab Results   Component Value Date    HEPAIGG Negative 08/16/2022     Lab Results   Component Value Date    VARICELLAZOS 2.98 (H) 08/16/2022    VARICELLAINT Positive (A) 08/16/2022 "     Immunization History   Administered Date(s) Administered    Hepatitis A, Adult 03/07/2023    Influenza - Quadrivalent - PF *Preferred* (6 months and older) 11/07/2019    Influenza - Trivalent - PF (ADULT) 11/14/2018    Pneumococcal Conjugate - 20 Valent 03/07/2023     Flu shot: defer   COVID vaccine/booster:  defer   Tetanus (TdaP): recommended   HPV:    NA  Meningococcal: NA  Hepatitis A:  hep A #2 due by 3/2024  MMR (live vaccine):  immune      Shingrix: recommended after age 51 yo     Review of Systems   Constitutional:  Negative for chills, fever and weight loss.   HENT:          No oral ulcers, dysphagia, oral thrush   Eyes:  Negative for blurred vision, pain and redness.   Respiratory:  Negative for cough and shortness of breath.    Cardiovascular:  Negative for chest pain.   Gastrointestinal:  Positive for abdominal pain. Negative for heartburn, nausea and vomiting.   Genitourinary:  Negative for dysuria and hematuria.   Musculoskeletal:  Negative for back pain and joint pain.   Skin:  Negative for rash.   Psychiatric/Behavioral:  Negative for depression. The patient is not nervous/anxious and does not have insomnia.      All Medical History/Surgical History/Family History/Social History/Allergies have been reviewed and updated in EMR    Review of patient's allergies indicates:  No Known Allergies    Outpatient Medications Marked as Taking for the 12/1/23 encounter (Office Visit) with Zana Saldana MD   Medication Sig Dispense Refill    buPROPion (WELLBUTRIN XL) 300 MG 24 hr tablet Take 1 tablet (300 mg total) by mouth once daily. 30 tablet 5    desog-e.estradioL/e.estradioL (VIORELE, 28,) 0.15-0.02 mgx21 /0.01 mg x 5 per tablet Take 1 tablet by mouth once daily. 84 tablet 11    lamoTRIgine (LAMICTAL) 150 MG Tab Take 150 mg by mouth once daily.      mesalamine (LIALDA) 1.2 gram TbEC Take 4 tablets (4.8 g total) by mouth daily with breakfast. 120 tablet 3     Vital Signs:  There were no vitals taken  for this visit.     Physical Exam    Labs:   Lab Results   Component Value Date    CRP 2.4 08/16/2022    CALPROTECTIN <27.1 08/18/2022     Lab Results   Component Value Date    HEPBSAG Negative 08/16/2022    HEPBCAB Negative 08/16/2022     Lab Results   Component Value Date    TBGOLDPLUS Negative 08/16/2022     Lab Results   Component Value Date    XRLKAFPS02IW 40 08/16/2022    KRJVSPYQ45 459 08/16/2022     Lab Results   Component Value Date    WBC 7.10 03/07/2023    HGB 12.5 03/07/2023    HCT 38.5 03/07/2023    MCV 93 03/07/2023     03/07/2023     Lab Results   Component Value Date    CREATININE 0.9 03/07/2023    ALBUMIN 4.0 03/07/2023    BILITOT 0.4 03/07/2023    ALKPHOS 65 03/07/2023    AST 19 03/07/2023    ALT 13 03/07/2023     Assessment/Plan:  Lucía Jewell is a 38 y.o. female with ulcerative colitis (proctitis), IBS, family history of celiac disease.     Patient had a recent change in symptoms in the past month when she had some blood in the stool and on the toilet paper along with more frequent stool and abdominal cramping.  This did coincide with her trying to wean off of her Wellbutrin and Lamictal and she is restarted it about a month ago.  In addition she has had some stressors though overall feels that she is handling these well.  We discussed that with a normal stool calprotectin recently that is reassuring that there is no UC flare and this is more likely irritable bowel syndrome.  We also discussed fiber supplementation though she is taking adequate amount of fiber in her diet and also discussed trying Imodium 80 to see if this helps her symptoms on the and bad days of diarrhea.  She will continue to keep us informed if symptoms do not improve and we will continue to monitor her stool calprotectin closely.    # Ulcerative colitis (proctitis)    - continue lialda 4.8 g/d  - canasa suppositories given for patient to have on hand  - continue mediterranean diet   - stool calprotectin q 3-6  "mos- next 3/2024  - colonoscopy 9/2024  - pt to let us know immediately of any proctitis symptoms  - stool calprotectin 3/2024  - drug monitoring labs: CBC/CMP yearly (3/2024-[facility]), TPMT (normal 8/2022), TB quantiferon (neg 8/2022), Hep B testing (HBsAg, HBtotalcoreAb neg 8/2022)    # IBS  - likely current symptoms IBS given normal stool calprotectin  - triggers- came off of wellbutrin/lamictal but restarted  - will try imodium AD on "bad" days and see if this helps and if not will let us know  - discussed levsin/bentyl for cramping though at this time pt feels she does not need this  - IB Cindy may be option    # IBD specific health maintenance:  CRC risk: average risk, proctitis  Skin exam yearly - recommended self skin exams  Risk for osteopenia/osteoporosis- none  Pap smear- per GYN  Vitamin D- normal, no RFs, not on supplementation  Vaccines: no live vaccines, hep A #2 - reminded to get this done prior to 3/2024 and pt will let us know when she gets it, tetanus recommended and pt to get     Follow up in about 6 months (around 6/1/2024) for virtual Wed AM and cancel existing appt 1/24/24.     The patient location is: [place]  The chief complaint leading to consultation is: diarrhea    Visit type: audiovisual    Face to Face time with patient: 20 minutes  43 minutes of total time spent on the encounter, which includes face to face time and non-face to face time preparing to see the patient (eg, review of tests), Obtaining and/or reviewing separately obtained history, Documenting clinical information in the electronic or other health record, Independently interpreting results (not separately reported) and communicating results to the patient/family/caregiver, or Care coordination (not separately reported).     Each patient to whom he or she provides medical services by telemedicine is:  (1) informed of the relationship between the physician and patient and the respective role of any other health care provider with " respect to management of the patient; and (2) notified that he or she may decline to receive medical services by telemedicine and may withdraw from such care at any time.    Zana Saldana MD   Department of Gastroenterology  Medical Director, Inflammatory Bowel Disease

## 2024-02-18 DIAGNOSIS — K51.218 ULCERATIVE PROCTITIS WITH OTHER COMPLICATION: ICD-10-CM

## 2024-02-19 ENCOUNTER — TELEPHONE (OUTPATIENT)
Dept: GASTROENTEROLOGY | Facility: CLINIC | Age: 39
End: 2024-02-19
Payer: COMMERCIAL

## 2024-02-19 RX ORDER — MESALAMINE 1.2 G/1
TABLET, DELAYED RELEASE ORAL
Qty: 120 TABLET | Refills: 0 | Status: SHIPPED | OUTPATIENT
Start: 2024-02-19 | End: 2024-03-18

## 2024-02-19 NOTE — TELEPHONE ENCOUNTER
----- Message from Zana Saldana MD sent at 2/19/2024  1:07 PM CST -----  Call pt and see how she is feeling. Also make sure she remembers to get labs 3/2024. Orders are in epic message but I see did not read last message so want to be sure.   I have refilled her lialda    SS

## 2024-02-20 NOTE — TELEPHONE ENCOUNTER
"Spoke with Lucía:  - when asked how she's been doing since her last OV, she states, "I've been doing so good."   - discussed stool remedios and lab work due in March, she states she has this on her calendar  - encouraged her to call/message with any questions/concerns  "

## 2024-03-07 LAB
ALBUMIN SERPL-MCNC: 4.5 G/DL (ref 3.9–4.9)
ALBUMIN/GLOB SERPL: 1.8 {RATIO} (ref 1.2–2.2)
ALP SERPL-CCNC: 66 IU/L (ref 44–121)
ALT SERPL-CCNC: 15 IU/L (ref 0–32)
AST SERPL-CCNC: 25 IU/L (ref 0–40)
BASOPHILS # BLD AUTO: 0.1 X10E3/UL (ref 0–0.2)
BASOPHILS NFR BLD AUTO: 1 %
BILIRUB SERPL-MCNC: 0.2 MG/DL (ref 0–1.2)
BUN SERPL-MCNC: 7 MG/DL (ref 6–20)
BUN/CREAT SERPL: 7 (ref 9–23)
CALCIUM SERPL-MCNC: 9.9 MG/DL (ref 8.7–10.2)
CHLORIDE SERPL-SCNC: 102 MMOL/L (ref 96–106)
CO2 SERPL-SCNC: 21 MMOL/L (ref 20–29)
CREAT SERPL-MCNC: 0.96 MG/DL (ref 0.57–1)
EOSINOPHIL # BLD AUTO: 0.2 X10E3/UL (ref 0–0.4)
EOSINOPHIL NFR BLD AUTO: 4 %
ERYTHROCYTE [DISTWIDTH] IN BLOOD BY AUTOMATED COUNT: 12.2 % (ref 11.7–15.4)
EST. GFR  (NO RACE VARIABLE): 78 ML/MIN/1.73
GLOBULIN SER CALC-MCNC: 2.5 G/DL (ref 1.5–4.5)
GLUCOSE SERPL-MCNC: 82 MG/DL (ref 70–99)
HCT VFR BLD AUTO: 39.5 % (ref 34–46.6)
HGB BLD-MCNC: 12.8 G/DL (ref 11.1–15.9)
IMM GRANULOCYTES # BLD AUTO: 0 X10E3/UL (ref 0–0.1)
IMM GRANULOCYTES NFR BLD AUTO: 0 %
LYMPHOCYTES # BLD AUTO: 1.8 X10E3/UL (ref 0.7–3.1)
LYMPHOCYTES NFR BLD AUTO: 36 %
MCH RBC QN AUTO: 30.3 PG (ref 26.6–33)
MCHC RBC AUTO-ENTMCNC: 32.4 G/DL (ref 31.5–35.7)
MCV RBC AUTO: 94 FL (ref 79–97)
MONOCYTES # BLD AUTO: 0.6 X10E3/UL (ref 0.1–0.9)
MONOCYTES NFR BLD AUTO: 13 %
NEUTROPHILS # BLD AUTO: 2.4 X10E3/UL (ref 1.4–7)
NEUTROPHILS NFR BLD AUTO: 46 %
PLATELET # BLD AUTO: 323 X10E3/UL (ref 150–450)
POTASSIUM SERPL-SCNC: 4.5 MMOL/L (ref 3.5–5.2)
PROT SERPL-MCNC: 7 G/DL (ref 6–8.5)
RBC # BLD AUTO: 4.22 X10E6/UL (ref 3.77–5.28)
SODIUM SERPL-SCNC: 139 MMOL/L (ref 134–144)
WBC # BLD AUTO: 5.1 X10E3/UL (ref 3.4–10.8)

## 2024-03-13 LAB — CALPROTECTIN STL-MCNT: 25 UG/G (ref 0–120)

## 2024-03-16 DIAGNOSIS — K51.218 ULCERATIVE PROCTITIS WITH OTHER COMPLICATION: ICD-10-CM

## 2024-03-18 RX ORDER — MESALAMINE 1.2 G/1
TABLET, DELAYED RELEASE ORAL
Qty: 120 TABLET | Refills: 11 | Status: SHIPPED | OUTPATIENT
Start: 2024-03-18

## 2024-04-11 ENCOUNTER — PATIENT MESSAGE (OUTPATIENT)
Dept: GASTROENTEROLOGY | Facility: CLINIC | Age: 39
End: 2024-04-11
Payer: COMMERCIAL

## 2024-06-05 ENCOUNTER — OFFICE VISIT (OUTPATIENT)
Dept: GASTROENTEROLOGY | Facility: CLINIC | Age: 39
End: 2024-06-05
Payer: COMMERCIAL

## 2024-06-05 VITALS — WEIGHT: 116 LBS | BODY MASS INDEX: 21.9 KG/M2 | HEIGHT: 61 IN

## 2024-06-05 DIAGNOSIS — K51.218 ULCERATIVE PROCTITIS WITH OTHER COMPLICATION: Primary | ICD-10-CM

## 2024-06-05 PROCEDURE — 1159F MED LIST DOCD IN RCRD: CPT | Mod: CPTII,95,, | Performed by: INTERNAL MEDICINE

## 2024-06-05 PROCEDURE — 99214 OFFICE O/P EST MOD 30 MIN: CPT | Mod: 95,,, | Performed by: INTERNAL MEDICINE

## 2024-06-05 PROCEDURE — 3008F BODY MASS INDEX DOCD: CPT | Mod: CPTII,95,, | Performed by: INTERNAL MEDICINE

## 2024-06-05 PROCEDURE — G2211 COMPLEX E/M VISIT ADD ON: HCPCS | Mod: 95,,, | Performed by: INTERNAL MEDICINE

## 2024-06-05 NOTE — PROGRESS NOTES
[facility]             Inflammatory Bowel Disease   Follow-up  Note              TODAY'S VISIT DATE:  2024    Chief Complaint:   Chief Complaint   Patient presents with    Ulcerative Colitis     PCP: Leann Davis    Previous History:  Lucía Jewell is a 39 y.o.ulcerative colitis (proctitis), IBS, family history of celiac disease. In  she had constipation and underwent barium enema and after this rectal bleeding started.  In approximately  she had a colonoscopy in [facility] and she was diagnosed with ulcerative proctitis and treated with proctofoam 1-2 mos and this helped. From [id number] she had intermittent constipation and blood in stool and treated this with miralax. In 2004 she started with rectal bleeding and colonoscopy showed distal 10 cm of rectum with granularity and friability and multiple erosions with remainder of colon and TI normal.  She was diagnosed with ulcerative proctitis and treated with asacol 400 mg BID which was optimized to 6.4 g/d but the number of pills/frequency was difficult at her age and she was only taking about 1/2 the dose recommended. She moved to [facility] from [place] in  and from [id number] and when she met Dr. Alston due to difficult taking medicine asacol was discontinued and she was started on lialda 2.4 g/d. When she was on asacol from [id number] symptoms would improve with flares a few times/year (constipation and rectal bleeding) and these were treated with 2 courses of prednisone, rowasa enemas and canasa suppositories (not compliant but it helped when she took it). At times she was also given miralax for constipation sx that seemed to precipitate the rectal bleeding.  While on lialda 2.4 g/day she was compliant with medicines though continued with flares 2-3 times/year treated miralax and had about once a year would take short course of prednisone. Patient delivered a healthy baby girl by  13 and during  pregnancy she felt well and was compliant lialda 2.4 g/d.  She saw Dr. Alston 2015 and reported abd pain that improved with defecation, 3-4 formed BMs/d, daily reflux, postprandial bloating.  She was prescribed librax and colonoscopy 9/21/15 showed normal colon with localized continuous erythema with mild granularity and erosions in the rectum to 10 cm. From 5308-2901 she continued on lialda and briefly due to insurance issues in early 2017 she took apriso for a few mos and went back to lialda at higher dose of 4.8 g/d in early summer 2017. In 4/2017 pt then established with Dr. Ott at with time she reported 20 small volumed stool/d with crampy abd pain on apriso 1.5 g/d and rowasa enemas added to her treatment. Flex sig 4/24/17 significant for diffuse granularity, friability, patchy ulcerations with loss of normal vascularity cw UC Jenkins score 2-3 and biopsies of rectum c/w chronic active proctitis with crypt abscesses. She took a short course of prednisone and transitioned to lialda 4.8 g/d and continued on rowasa qhs (compliant with 2 weeks). The combination of prednisone, lialda and rowasa enemas and she feels that the prednisone helped her retain the enemas.  In 11/2017 pt had some intermittent bleeding and patient asked to decrease lialda 2.4 g/d and rowasa enemas qhs.  Flex sig 5/11/18 significant for right at the anal verge in the most distal 1-2 cm faint granularity with mild loss of vascularity c/w Jenkins 1 disease. From [id number] she was on intermittent courses rowasa enemas for 2 weeks and this would help when able to hold it in. By 10/2018 she reported if she stopped or skipped dose of rowasa enemas her symptoms would return and she continued on lialda 2.4 g/d and she was advised at this visit to start canasa suppositories qhs, decrease rowasa enemas to once a week, continue lialda 2.4 g/d. Due to ongoing symptoms she started Humira 12/2018 with good response and pt wanted to stop lialda by early 2019.  In 4/2020 due to dyspepsia she was placed on PPI and underwent US 10/2020 for RUQ abd pain which was normal with normal GB EF. She then continued on monotherapy with humira 40 mg SC q 2 weeks.  In approximately 11/2021 pt developed rectal bleeding and started on prednisone course.  On 12/3/21 pt had trough humira levels 15.6/Abs neg. Flex sig 1/31/22 showed circumferential erythema, grnularrity with loss of vascularity and focal punctate ulcers starting 10 cm from anus with biopsies of rectum c/w CMV neg moderately active proctitis and rectosigmoid colon bx normal. In 11/2021 she restarted lialda 4.8 g/d (good adherent). Due to continued symptoms Dr. Ott attempted to get weekly humira approved though this was denied by her insurance so on 3/22/22 she started on stelara with prednisone taper (stopped in 5/2022). Currently patient reports about 40 trips to the toilet of which 35 of those trips (2 trips per hour) are during the day and 5 trips nocturnal b/w hrs of 9pm-5am. Of the total trips to the toilet about 50% are soft to skinny formed stool and remaining are false trips with mucus and blood on toilet paper. Pt has cough, back pain, anxiety/depression. I met her in the IBD clinic on 8/16/22 at which time she was on stelara 90 mg SC q 8 weeks, lialda 4.8 g/d, canasa supp twice a week (urgency and so difficult to take supp) so we proceeded with stool studies to r/o infection and stool calprotectin and advised her to take off of work for 2 weeks and take a course of canasa supp BID. Prior to starting canasa BID she had diarrhea and stool studies neg for infection and suprisingly her stool calprotectin was normal. She took a course of canasa BID from 8/23/22-9/30/22 but then had worsening diarrhea in early 9/2022 and repeat stool calprotectin 9/2022 273.  Meanwhile she continued on stelara every 8 weeks and lialda 4.8 g/d and started uceris foam BID 10/5/22 but not compliant and was only taking 1-2 times/week. She  continued on stelara and was optimized to q 6 weeks starting 11/2/22 due to trough stelara levels 11/2022 of 3.5/Abs neg and fecal calprotectin normal 11/11/22. Previously in April patient had some insurance issues and her stelara was delayed ? weeks/days. On 1/11/23 she stopped tacrolimus suppositories and continued canasa qhs and on 2/27/23 fecal calprotectin normal so on 3/6/23 changed canasa supp qod and was supposed to continue but discontinued on her own 3/25/23. On 3/7/23 trough stelara 7.6/Abs neg (on stelara q 6 weeks) and due to not ever being better on stelara and being in remission for her ulcerative proctitis on topical therapy we proceeded with discontinuation of stelara 4/26/23.  In 6/2023 she met with dietician regarding SCD/anti-inflammatory diet and her stool calprotectin 6/2/23 normal. Early 11/2023 2 soft black/dark red stool and notified us on 11/18/23 and proceeded with stool calprotectin which was normal and we had recommended canasa but she only took for 2 days and stopped due to bloating/gas and we agreed no need to take when stool calprotectin was normal.     Interval History:  - current IBD meds: lialda 4.8 g/d   - tried weaning off wellbutrin and lamictal 1 month ago had to go back to normal dose  - diet- mediterranean diet  - 1 loose BMs/d, no blood/urgency/nocturnal BMs  - 3/8/24 stool calprotectin 25  - NSAID use: No  - Narcotic use: No  - Alternative/complementary meds for IBD: No    Prior Pertinent Surgeries:   None    Last pertinent Endoscopy/Imaging:   10/2020 US: normal with normal GB EF.  9/29/22 colonoscopy: The perianal and digital rectal examinations were normal. From anal verge to 20 cm mild to moderately active nflammation characterized by diffuse erythema, few superficial and linear ulcerations. From 20-25 cm there is mild erythema and linear ulcerations. From 25 cm to the cecum the mucosa is normal with good preservation of vasculature. Normal ICV. Biopsies TI normal.  Biopsies of cecum/ascending with patchy minimal architectural distortion, transverse colon normal, descending with patchy minimal increase in lamina propria eosinophils, sigmoid normal, rectosigmoid with chronic active colitis with mild cryptitis, rectum with chronic active colitis, severe with ulceration/granulation tissue formation, CMV neg    Therapeutic Drug Monitoring Labs:  12/3/21 trough humira levels 15.6/Abs neg  11/1/22 trough stelara levels 3.5/Abs neg (stelara 90 mg SC q 8 weeks)  3/7/23 trough stelara 7.6/Abs neg (on stelara 90 mg q 6 weeks)    Prior IBD Therapies:  Lialda  Rowasa enemas- effective with combination of lialda, rowasa   Canasa suppositories- ineffective, took BID 8/2022-9/2022  Prednisone- effective in past  Humira 40 mg SC q 2 weeks (12/2018-approximately 2/2022)- secondary nonresponder with good levels/neg abs, weekly humira denied though good drug levels  uceris foam BID prescribed though pt initially taking qhs and then only taking 1-2 times/week (10/2022)  tacrolimus supp (qhs 10/28/22, qod 11/30/22, stopped 1/11/23)- effective  canasa supp (11/30/22, qod 3/6/23, stopped 3/25/23 on her own)-effective   Stelara 90 mg SC q 6 weeks (started 3/2022, 90 mg SC q 6 weeks 11/2/22, stopped 4/26/23) - pt requested to stop    Vaccinations:  Lab Results   Component Value Date    HEPBSURFABQU POSITIVE 08/16/2022    HEPBSURFABQU >1,000 08/16/2022     Lab Results   Component Value Date    HEPAIGG Negative 08/16/2022     Lab Results   Component Value Date    VARICELLAZOS 2.98 (H) 08/16/2022    VARICELLAINT Positive (A) 08/16/2022     Lab Results   Component Value Date    MUMPSIGGSCRE 1.62 (H) 08/16/2022    MUMPSIGGINTE Positive (A) 08/16/2022      Lab Results   Component Value Date    RUBEOLAIGGAN 1.58 (H) 08/16/2022    RUBEOLAINTER Positive (A) 08/16/2022     Immunization History   Administered Date(s) Administered    Hepatitis A, Adult 03/07/2023    Influenza - Quadrivalent - PF *Preferred* (6  "months and older) 11/07/2019    Influenza - Trivalent - PF (ADULT) 11/14/2018    Pneumococcal Conjugate - 20 Valent 03/07/2023   Flu shot: recommended yearly   COVID vaccine/booster:  per CDC recommendations  RSV:  after age 51 yo  Tetanus (Tdap):  recommended now  HPV:    NA  Meningococcal: NA  Hepatitis A:  hep A #2 9/2023  Shingrix: recommended after age 51 yo     Review of Systems   Constitutional:  Negative for chills, fever and weight loss.   HENT:          No oral ulcers, dysphagia, oral thrush   Eyes:  Negative for blurred vision, pain and redness.   Respiratory:  Negative for cough and shortness of breath.    Cardiovascular:  Negative for chest pain.   Gastrointestinal:  Negative for abdominal pain, heartburn, nausea and vomiting.   Genitourinary:  Negative for dysuria and hematuria.   Musculoskeletal:  Negative for back pain and joint pain.   Skin:  Negative for rash.   Psychiatric/Behavioral:  Negative for depression. The patient is not nervous/anxious and does not have insomnia.      All Medical History/Surgical History/Family History/Social History/Allergies have been reviewed and updated in EMR    Outpatient Medications Marked as Taking for the 6/5/24 encounter (Office Visit) with Zana Saldana MD   Medication Sig Dispense Refill    buPROPion (WELLBUTRIN XL) 300 MG 24 hr tablet Take 1 tablet (300 mg total) by mouth once daily. 30 tablet 5    desog-e.estradioL/e.estradioL (VIORELE, 28,) 0.15-0.02 mgx21 /0.01 mg x 5 per tablet Take 1 tablet by mouth once daily. 84 tablet 11    lamoTRIgine (LAMICTAL) 150 MG Tab Take 150 mg by mouth once daily.      mesalamine (LIALDA) 1.2 gram TbEC TAKE 4 TABLETS(4.8 GRAMS) BY MOUTH DAILY WITH BREAKFAST 120 tablet 11     Vital Signs:  Ht 5' 1" (1.549 m)   Wt 52.6 kg (116 lb)   BMI 21.92 kg/m²    Physical Exam    Labs:   Lab Results   Component Value Date    CRP 2.4 08/16/2022    CALPROTECTIN <27.1 08/18/2022     Lab Results   Component Value Date    HEPBSAG Negative " 2022    HEPBCAB Negative 2022     Lab Results   Component Value Date    TBGOLDPLUS Negative 2022     Lab Results   Component Value Date    OFKZGRXW43OQ 40 2022    URHWGMLS15 459 2022     Lab Results   Component Value Date    WBC 5.1 2024    HGB 12.8 2024    HCT 39.5 2024    MCV 94 2024     2024     Lab Results   Component Value Date    CREATININE 0.96 2024    ALBUMIN 4.5 2024    BILITOT 0.2 2024    ALKPHOS 65 2023    AST 25 2024    ALT 15 2024     Assessment/Plan:  Lucía Jewell is a 39 y.o. female with ulcerative colitis (proctitis), IBS, family history of celiac disease.     Patient is feeling well with continued clinical remission and normal stool calprotectin 3/2024 with plan to monitor closely every 6 mos. We had planned on repeat colonoscopy 2024 but pt would like to defer and we agreed on early  to be scheduled at her next appt.     # Ulcerative colitis (proctitis):  - note: IBS- symptoms stable and not needing any imodium  - continue lialda 4.8 g/d  - canasa suppositories- pt has on hand if needed but need to be sure not  if she restarts  - continue mediterranean diet   - stool calprotectin 2024, 2024  - colonoscopy defer until early - pt preference  - drug monitoring labs: CBC/CMP yearly (3/2025-[facility]), TPMT (normal 2022), TB quantiferon (neg 2022), Hep B testing (HBsAg, HBtotalcoreAb neg 2022)    # IBD specific health maintenance:  CRC risk: average risk, proctitis  Skin exam yearly - recommended self skin exams  Risk for osteopenia/osteoporosis- none  Pap smear- per GYN  Vitamin D- normal, no RFs, not on supplementation  Vaccines: pt to get hep A #2 and tetanus locally    Visit today is associated with current or anticipated ongoing medical care related to this patient's single serious condition/complex condition ulcerative colitis.     Follow up in about 6 months (around  12/5/2024) for virtual Wed AM.     The patient location is:   The chief complaint leading to consultation is: ulcerative colitis     Visit type: audiovisual    Face to Face time with patient: 16 minutes  30 minutes of total time spent on the encounter, which includes face to face time and non-face to face time preparing to see the patient (eg, review of tests), Obtaining and/or reviewing separately obtained history, Documenting clinical information in the electronic or other health record, Independently interpreting results (not separately reported) and communicating results to the patient/family/caregiver, or Care coordination (not separately reported).     Each patient to whom he or she provides medical services by telemedicine is:  (1) informed of the relationship between the physician and patient and the respective role of any other health care provider with respect to management of the patient; and (2) notified that he or she may decline to receive medical services by telemedicine and may withdraw from such care at any time.    Zana Saldana MD   Department of Gastroenterology  Medical Director, Inflammatory Bowel Disease

## 2024-06-05 NOTE — PATIENT INSTRUCTIONS
- we encourage self skin exam for skin cancer screening, here is a you tube link on how to do this [email or web address]://[email or web address]/[email or web address]/[email or web address]/[email or web address]   - turn in stool calprotectin 9/2024  - pap smears and mammograms per PCP

## 2024-06-25 ENCOUNTER — PATIENT MESSAGE (OUTPATIENT)
Dept: GASTROENTEROLOGY | Facility: CLINIC | Age: 39
End: 2024-06-25
Payer: COMMERCIAL

## 2024-08-10 ENCOUNTER — PATIENT MESSAGE (OUTPATIENT)
Dept: GASTROENTEROLOGY | Facility: CLINIC | Age: 39
End: 2024-08-10
Payer: COMMERCIAL

## 2024-08-10 DIAGNOSIS — K51.218 ULCERATIVE PROCTITIS WITH OTHER COMPLICATION: Primary | ICD-10-CM

## 2024-08-12 RX ORDER — MESALAMINE 1.2 G/1
4.8 TABLET, DELAYED RELEASE ORAL
Qty: 120 TABLET | Refills: 7 | Status: SHIPPED | OUTPATIENT
Start: 2024-08-12

## 2024-08-12 NOTE — TELEPHONE ENCOUNTER
Primary provider: CAMPBELL    IBD medications lialda 4.8 g/d     Refill request for lialda 4.8 g/d     Allergies reviewed Yes    Drug Monitoring labs/frequency for all IBD meds:  CBC CMP yearly    Lab Results   Component Value Date    WBC 5.1 03/06/2024    HGB 12.8 03/06/2024    HCT 39.5 03/06/2024    MCV 94 03/06/2024     03/06/2024     Lab Results   Component Value Date    CREATININE 0.96 03/06/2024    ALBUMIN 4.5 03/06/2024    BILITOT 0.2 03/06/2024    ALKPHOS 65 03/07/2023    AST 25 03/06/2024    ALT 15 03/06/2024       Lab due date (mo/yr):  3/2025    Next appt: 1/8/25    RX refill sent to provider for amount until next labs: Yes

## 2025-01-08 ENCOUNTER — OFFICE VISIT (OUTPATIENT)
Dept: GASTROENTEROLOGY | Facility: CLINIC | Age: 40
End: 2025-01-08
Payer: COMMERCIAL

## 2025-01-08 ENCOUNTER — PATIENT MESSAGE (OUTPATIENT)
Dept: GASTROENTEROLOGY | Facility: CLINIC | Age: 40
End: 2025-01-08

## 2025-01-08 DIAGNOSIS — K51.218 ULCERATIVE PROCTITIS WITH OTHER COMPLICATION: Primary | ICD-10-CM

## 2025-01-08 DIAGNOSIS — K58.2 IRRITABLE BOWEL SYNDROME WITH BOTH CONSTIPATION AND DIARRHEA: ICD-10-CM

## 2025-01-08 DIAGNOSIS — F99 MENTAL DISORDER: ICD-10-CM

## 2025-01-08 RX ORDER — GLUCOSAMINE SULFATE 500 MG
1 TABLET ORAL DAILY
COMMUNITY

## 2025-01-08 RX ORDER — FAMOTIDINE 20 MG/1
20 TABLET, FILM COATED ORAL 2 TIMES DAILY
COMMUNITY

## 2025-01-08 NOTE — PROGRESS NOTES
[facility]             Inflammatory Bowel Disease   Follow-up  Note              TODAY'S VISIT DATE:  2025    Chief Complaint:   Chief Complaint   Patient presents with    Ulcerative Colitis     PCP: Leann Davis    Previous History:  Lucía Jewell is a 39 y.o.ulcerative colitis (proctitis), IBS, family history of celiac disease. In  she had constipation and underwent barium enema and after this rectal bleeding started.  In approximately  she had a colonoscopy in [facility] and she was diagnosed with ulcerative proctitis and treated with proctofoam 1-2 mos and this helped. From 5305-9152 she had intermittent constipation and blood in stool and treated this with miralax. In 2004 she started with rectal bleeding and colonoscopy showed distal 10 cm of rectum with granularity and friability and multiple erosions with remainder of colon and TI normal.  She was diagnosed with ulcerative proctitis and treated with asacol 400 mg BID which was optimized to 6.4 g/d but the number of pills/frequency was difficult at her age and she was only taking about 1/2 the dose recommended. She moved to [facility] from [place] in  and from 4798-7163 and when she met Dr. Alston due to difficult taking medicine asacol was discontinued and she was started on lialda 2.4 g/d. When she was on asacol from 4042-5855 symptoms would improve with flares a few times/year (constipation and rectal bleeding) and these were treated with 2 courses of prednisone, rowasa enemas and canasa suppositories (not compliant but it helped when she took it). At times she was also given miralax for constipation sx that seemed to precipitate the rectal bleeding.  While on lialda 2.4 g/day she was compliant with medicines though continued with flares 2-3 times/year treated miralax and had about once a year would take short course of prednisone. Patient delivered a healthy baby girl by  13 and during  pregnancy she felt well and was compliant lialda 2.4 g/d.  She saw Dr. Alston 2015 and reported abd pain that improved with defecation, 3-4 formed BMs/d, daily reflux, postprandial bloating.  She was prescribed librax and colonoscopy 9/21/15 showed normal colon with localized continuous erythema with mild granularity and erosions in the rectum to 10 cm. From 3488-5530 she continued on lialda and briefly due to insurance issues in early 2017 she took apriso for a few mos and went back to lialda at higher dose of 4.8 g/d in early summer 2017. In 4/2017 pt then established with Dr. Ott at with time she reported 20 small volumed stool/d with crampy abd pain on apriso 1.5 g/d and rowasa enemas added to her treatment. Flex sig 4/24/17 significant for diffuse granularity, friability, patchy ulcerations with loss of normal vascularity cw UC Jenkins score 2-3 and biopsies of rectum c/w chronic active proctitis with crypt abscesses. She took a short course of prednisone and transitioned to lialda 4.8 g/d and continued on rowasa qhs (compliant with 2 weeks). The combination of prednisone, lialda and rowasa enemas and she feels that the prednisone helped her retain the enemas.  In 11/2017 pt had some intermittent bleeding and patient asked to decrease lialda 2.4 g/d and rowasa enemas qhs.  Flex sig 5/11/18 significant for right at the anal verge in the most distal 1-2 cm faint granularity with mild loss of vascularity c/w Jenkins 1 disease. From 5496-8037 she was on intermittent courses rowasa enemas for 2 weeks and this would help when able to hold it in. By 10/2018 she reported if she stopped or skipped dose of rowasa enemas her symptoms would return and she continued on lialda 2.4 g/d and she was advised at this visit to start canasa suppositories qhs, decrease rowasa enemas to once a week, continue lialda 2.4 g/d. Due to ongoing symptoms she started Humira 12/2018 with good response and pt wanted to stop lialda by early 2019.  In 4/2020 due to dyspepsia she was placed on PPI and underwent US 10/2020 for RUQ abd pain which was normal with normal GB EF. She then continued on monotherapy with humira 40 mg SC q 2 weeks.  In approximately 11/2021 pt developed rectal bleeding and started on prednisone course.  On 12/3/21 pt had trough humira levels 15.6/Abs neg. Flex sig 1/31/22 showed circumferential erythema, grnularrity with loss of vascularity and focal punctate ulcers starting 10 cm from anus with biopsies of rectum c/w CMV neg moderately active proctitis and rectosigmoid colon bx normal. In 11/2021 she restarted lialda 4.8 g/d (good adherent). Due to continued symptoms Dr. Ott attempted to get weekly humira approved though this was denied by her insurance so on 3/22/22 she started on stelara with prednisone taper (stopped in 5/2022). Currently patient reports about 40 trips to the toilet of which 35 of those trips (2 trips per hour) are during the day and 5 trips nocturnal b/w hrs of 9pm-5am. Of the total trips to the toilet about 50% are soft to skinny formed stool and remaining are false trips with mucus and blood on toilet paper. Pt has cough, back pain, anxiety/depression. I met her in the IBD clinic on 8/16/22 at which time she was on stelara 90 mg SC q 8 weeks, lialda 4.8 g/d, canasa supp twice a week (urgency and so difficult to take supp) so we proceeded with stool studies to r/o infection and stool calprotectin and advised her to take off of work for 2 weeks and take a course of canasa supp BID. Prior to starting canasa BID she had diarrhea and stool studies neg for infection and suprisingly her stool calprotectin was normal. She took a course of canasa BID from 8/23/22-9/30/22 but then had worsening diarrhea in early 9/2022 and repeat stool calprotectin 9/2022 273.  Meanwhile she continued on stelara every 8 weeks and lialda 4.8 g/d and started uceris foam BID 10/5/22 but not compliant and was only taking 1-2 times/week. She  continued on stelara and was optimized to q 6 weeks starting 11/2/22 due to trough stelara levels 11/2022 of 3.5/Abs neg and fecal calprotectin normal 11/11/22. Previously in April patient had some insurance issues and her stelara was delayed ? weeks/days. On 1/11/23 she stopped tacrolimus suppositories and continued canasa qhs and on 2/27/23 fecal calprotectin normal so on 3/6/23 changed canasa supp qod and was supposed to continue but discontinued on her own 3/25/23. On 3/7/23 trough stelara 7.6/Abs neg (on stelara q 6 weeks) and due to not ever being better on stelara and being in remission for her ulcerative proctitis on topical therapy we proceeded with discontinuation of stelara 4/26/23.  In 6/2023 she met with dietician regarding SCD/anti-inflammatory diet and her stool calprotectin 6/2/23 normal. Early 11/2023 2 soft black/dark red stool and notified us on 11/18/23 and proceeded with stool calprotectin which was normal and we had recommended canasa but she only took for 2 days and stopped due to bloating/gas and we agreed no need to take when stool calprotectin was normal. Stool calprotectin 3/8/24 normal.     Interval History:  - current IBD meds: lialda 4.8 g/d   - diet- mediterranean diet  - quit working and not eating due do depression  - 1 loose BMs/d, no blood/urgency/nocturnal BMs  - stool calprotectin:  3/2024 normal, 6/2024 and 9/2024- pt did not turn this in  - klonopin started few weeks ago for panic attack, this Friday seeing a psychiatrist- TMS  - mouth sores- inner cheek, intermittent , ongoing intermittent in past 6 mos, sees dentist regularly and told to take lysine  - cough- mucus and gagging at night, pepcid helps  - anxiety/depression  - heartburn  - NSAID use: No  - Narcotic use: No  - Alternative/complementary meds for IBD: No    Prior Pertinent Surgeries:   None    Last pertinent Endoscopy/Imaging:   10/2020 US: normal with normal GB EF.  9/29/22 colonoscopy: The perianal and digital  rectal examinations were normal. From anal verge to 20 cm mild to moderately active nflammation characterized by diffuse erythema, few superficial and linear ulcerations. From 20-25 cm there is mild erythema and linear ulcerations. From 25 cm to the cecum the mucosa is normal with good preservation of vasculature. Normal ICV. Biopsies TI normal. Biopsies of cecum/ascending with patchy minimal architectural distortion, transverse colon normal, descending with patchy minimal increase in lamina propria eosinophils, sigmoid normal, rectosigmoid with chronic active colitis with mild cryptitis, rectum with chronic active colitis, severe with ulceration/granulation tissue formation, CMV neg    Therapeutic Drug Monitoring Labs:  12/3/21 trough humira levels 15.6/Abs neg  11/1/22 trough stelara levels 3.5/Abs neg (stelara 90 mg SC q 8 weeks)  3/7/23 trough stelara 7.6/Abs neg (on stelara 90 mg q 6 weeks)    Prior IBD Therapies:  Lialda  Rowasa enemas- effective with combination of lialda, rowasa   Canasa suppositories- ineffective, took BID 8/2022-9/2022  Prednisone- effective in past  Humira 40 mg SC q 2 weeks (12/2018-approximately 2/2022)- secondary nonresponder with good levels/neg abs, weekly humira denied though good drug levels  uceris foam BID prescribed though pt initially taking qhs and then only taking 1-2 times/week (10/2022)  tacrolimus supp (qhs 10/28/22, qod 11/30/22, stopped 1/11/23)- effective  canasa supp (11/30/22, qod 3/6/23, stopped 3/25/23 on her own)-effective   Stelara 90 mg SC q 6 weeks (started 3/2022, 90 mg SC q 6 weeks 11/2/22, stopped 4/26/23) - pt requested to stop    Vaccinations:  Lab Results   Component Value Date    HEPBSURFABQU POSITIVE 08/16/2022    HEPBSURFABQU >1,000 08/16/2022     Lab Results   Component Value Date    HEPAIGG Negative 08/16/2022     Lab Results   Component Value Date    VARICELLAZOS 2.98 (H) 08/16/2022    VARICELLAINT Positive (A) 08/16/2022     Lab Results   Component  Value Date    MUMPSIGGSCRE 1.62 (H) 08/16/2022    MUMPSIGGINTE Positive (A) 08/16/2022      Lab Results   Component Value Date    RUBEOLAIGGAN 1.58 (H) 08/16/2022    RUBEOLAINTER Positive (A) 08/16/2022     Immunization History   Administered Date(s) Administered    Hepatitis A, Adult 03/07/2023    Influenza - Quadrivalent - PF *Preferred* (6 months and older) 11/07/2019    Influenza - Trivalent - Fluarix, Flulaval, Fluzone, Afluria - PF 11/14/2018    Pneumococcal Conjugate - 20 Valent 03/07/2023   Flu shot: recommended yearly   COVID vaccine/booster:  per CDC recommendations  RSV:  after age 51 yo  Tetanus (Tdap):  recommended now  HPV:    NA  Meningococcal: NA  Hepatitis A:  hep A #2 was due 9/2023  Shingrix: recommended after age 51 yo     Review of Systems   Constitutional:  Negative for chills, fever and weight loss.   HENT:          No oral ulcers, dysphagia, oral thrush   Eyes:  Negative for blurred vision, pain and redness.   Respiratory:  Negative for cough and shortness of breath.    Cardiovascular:  Negative for chest pain.   Gastrointestinal:  Negative for abdominal pain, heartburn, nausea and vomiting.   Genitourinary:  Negative for dysuria and hematuria.   Musculoskeletal:  Negative for back pain and joint pain.   Skin:  Negative for rash.   Psychiatric/Behavioral:  Negative for depression. The patient is not nervous/anxious and does not have insomnia.      All Medical History/Surgical History/Family History/Social History/Allergies have been reviewed and updated in EMR    No outpatient medications have been marked as taking for the 1/8/25 encounter (Office Visit) with Zana Saldana MD.     Vital Signs:  There were no vitals taken for this visit.   Physical Exam    Labs:   Lab Results   Component Value Date    CRP 2.4 08/16/2022    CALPROTECTIN <27.1 08/18/2022     Lab Results   Component Value Date    HEPBSAG Negative 08/16/2022    HEPBCAB Negative 08/16/2022     Lab Results   Component Value Date     TBGOLDPLUS Negative 08/16/2022     Lab Results   Component Value Date    RXEZRCOD19SH 40 08/16/2022    RCPGOCBX22 459 08/16/2022     Lab Results   Component Value Date    WBC 5.1 03/06/2024    HGB 12.8 03/06/2024    HCT 39.5 03/06/2024    MCV 94 03/06/2024     03/06/2024     Lab Results   Component Value Date    CREATININE 0.96 03/06/2024    ALBUMIN 4.5 03/06/2024    BILITOT 0.2 03/06/2024    ALKPHOS 65 03/07/2023    AST 25 03/06/2024    ALT 15 03/06/2024     Assessment/Plan:  Lucía Jewell is a 39 y.o. female with ulcerative colitis (proctitis), IBS, family history of celiac disease.     Patient is feeling well with continued clinical remission with issues of bipolar with depression and panic attacks that has affected her work, appetite and she is being treated for this.  Due to this and cost due to not working she cannot afford a colonoscopy at this time so we will plan on deferring this and vaccines per choice. She did not get stool calprotectin done in 2024 so we will proceed with this now along with routine labs.     # Ulcerative colitis (proctitis):  - note: IBS- symptoms stable and not needing any imodium  - continue lialda 4.8 g/d  - canasa supp qhs   - recommend restarting mediterranean diet   - stool calprotectin now-[facility]  - colonoscopy deferred due to cost by pt, will discuss again at next visit, understands risks/benefits of colonoscopy  - basic labs:  vit D and B12- requested by pt  - drug monitoring labs: CBC/CMP yearly (now), TPMT (normal 8/2022), TB quantiferon (neg 8/2022), Hep B testing (HBsAg, HBtotalcoreAb neg 8/2022)    # Bipolar d/o with janine  - this is affecting pt overall health  - seeing psychiatrist regularly    # Mouth ulcers  - not likely correlating with active disease  - lysine helping  - if stool calprotectin elevated will then consider further relation with IBD    # IBD specific health maintenance:  CRC risk: average risk, proctitis  Skin exam yearly - doing self skin  exams, has not seen dermatology  Risk for osteopenia/osteoporosis- none  Pap smear- per GYN, UTD   Vitamin D- normal, no RFs, not on supplementation  Vaccines- did not get hep A #2 or tetanus and will defer for now    Visit today is associated with current or anticipated ongoing medical care related to this patient's single serious condition/complex condition ulcerative colitis.     No follow-ups on file.     The patient location is: Home  The chief complaint leading to consultation is: ulcerative colitis     Visit type: audiovisual    Face to Face time with patient: 20 minutes  34 minutes of total time spent on the encounter, which includes face to face time and non-face to face time preparing to see the patient (eg, review of tests), Obtaining and/or reviewing separately obtained history, Documenting clinical information in the electronic or other health record, Independently interpreting results (not separately reported) and communicating results to the patient/family/caregiver, or Care coordination (not separately reported).     Each patient to whom he or she provides medical services by telemedicine is:  (1) informed of the relationship between the physician and patient and the respective role of any other health care provider with respect to management of the patient; and (2) notified that he or she may decline to receive medical services by telemedicine and may withdraw from such care at any time.    Zana Saldana MD   Department of Gastroenterology  Medical Director, Inflammatory Bowel Disease          Answers submitted by the patient for this visit:  Established Patient Questionnaire  (Submitted on 1/7/2025)  mouth sores: Yes

## 2025-07-09 ENCOUNTER — OFFICE VISIT (OUTPATIENT)
Dept: GASTROENTEROLOGY | Facility: CLINIC | Age: 40
End: 2025-07-09
Payer: COMMERCIAL

## 2025-07-09 ENCOUNTER — PATIENT MESSAGE (OUTPATIENT)
Dept: GASTROENTEROLOGY | Facility: CLINIC | Age: 40
End: 2025-07-09

## 2025-07-09 ENCOUNTER — TELEPHONE (OUTPATIENT)
Dept: GASTROENTEROLOGY | Facility: CLINIC | Age: 40
End: 2025-07-09
Payer: COMMERCIAL

## 2025-07-09 VITALS — BODY MASS INDEX: 21.9 KG/M2 | HEIGHT: 61 IN | WEIGHT: 116 LBS

## 2025-07-09 DIAGNOSIS — K51.218 ULCERATIVE PROCTITIS WITH OTHER COMPLICATION: Primary | ICD-10-CM

## 2025-07-09 PROCEDURE — 3008F BODY MASS INDEX DOCD: CPT | Mod: CPTII,95,, | Performed by: INTERNAL MEDICINE

## 2025-07-09 PROCEDURE — 1159F MED LIST DOCD IN RCRD: CPT | Mod: CPTII,95,, | Performed by: INTERNAL MEDICINE

## 2025-07-09 PROCEDURE — G2211 COMPLEX E/M VISIT ADD ON: HCPCS | Mod: 95,,, | Performed by: INTERNAL MEDICINE

## 2025-07-09 PROCEDURE — 98006 SYNCH AUDIO-VIDEO EST MOD 30: CPT | Mod: 95,,, | Performed by: INTERNAL MEDICINE

## 2025-07-09 PROCEDURE — 1160F RVW MEDS BY RX/DR IN RCRD: CPT | Mod: CPTII,95,, | Performed by: INTERNAL MEDICINE

## 2025-07-09 RX ORDER — BUPROPION HYDROCHLORIDE 300 MG/1
300 TABLET ORAL DAILY
COMMUNITY
Start: 2024-10-08 | End: 2025-10-08

## 2025-07-09 NOTE — PATIENT INSTRUCTIONS
- [facility] labs/stool now- CBC, CMP, HAV IgG, stool calprotectin  - get your tetanus shot done  - colonoscopy sept or oct 2025- miralax with gatorade    Contact us by sending a Piiku message or by calling [phone] to report the following:  - Changes in bowel symptoms  - Symptoms of infection including fever of 100.0 F or higher  - Antibiotics prescribed  - New medical diagnosis or new medication  - If you are planning on having surgery since we may need to tailor your meds based on surgery timing

## 2025-07-09 NOTE — TELEPHONE ENCOUNTER
Patient is scheduled for a Colonoscopy on 10/20/2025 with Dr. SANAM Saldana  Referral for procedure from Clinic visit    Received ventilated patient  Verified orders  Pt tolerating well  Pt properly restrained  ambu at bedside   Alarms set and functioning, pt in no distress at this time

## 2025-07-09 NOTE — PROGRESS NOTES
[facility]             Inflammatory Bowel Disease   Follow-up Note              TODAY'S VISIT DATE:  2025    Chief Complaint:   Chief Complaint   Patient presents with    Follow-up    Ulcerative Colitis     PCP: Leann Davis    Previous History:  Lucía Jewell is a 40 y.o. ulcerative colitis (proctitis). In  she had constipation and underwent barium enema and after this rectal bleeding started.  In approximately  she had a colonoscopy in [facility] and she was diagnosed with ulcerative proctitis and treated with proctofoam 1-2 mos and this helped. From [id number] she had intermittent constipation and blood in stool and treated this with miralax. In 2004 she started with rectal bleeding and colonoscopy showed distal 10 cm of rectum with granularity and friability and multiple erosions with remainder of colon and TI normal.  She was diagnosed with ulcerative proctitis and treated with asacol 400 mg BID which was optimized to 6.4 g/d but the number of pills/frequency was difficult at her age and she was only taking about 1/2 the dose recommended. She moved to [facility] from [place] in  and from [id number] and when she met Dr. Alston due to difficult taking medicine asacol was discontinued and she was started on lialda 2.4 g/d. When she was on asacol from 6947-7533 symptoms would improve with flares a few times/year (constipation and rectal bleeding) and these were treated with 2 courses of prednisone, rowasa enemas and canasa suppositories (not compliant but it helped when she took it). At times she was also given miralax for constipation sx that seemed to precipitate the rectal bleeding.  While on lialda 2.4 g/day she was compliant with medicines though continued with flares 2-3 times/year treated miralax and had about once a year would take short course of prednisone. Patient delivered a healthy baby girl by  13 and during pregnancy she felt well and  was compliant lialda 2.4 g/d.  She saw Dr. Alston 2015 and reported abd pain that improved with defecation, 3-4 formed BMs/d, daily reflux, postprandial bloating.  She was prescribed librax and colonoscopy 9/21/15 showed normal colon with localized continuous erythema with mild granularity and erosions in the rectum to 10 cm. From 8094-4625 she continued on lialda and briefly due to insurance issues in early 2017 she took apriso for a few mos and went back to lialda at higher dose of 4.8 g/d in early summer 2017. In 4/2017 pt then established with Dr. Ott at with time she reported 20 small volumed stool/d with crampy abd pain on apriso 1.5 g/d and rowasa enemas added to her treatment. Flex sig 4/24/17 significant for diffuse granularity, friability, patchy ulcerations with loss of normal vascularity cw UC Jenkins score 2-3 and biopsies of rectum c/w chronic active proctitis with crypt abscesses. She took a short course of prednisone and transitioned to lialda 4.8 g/d and continued on rowasa qhs (compliant with 2 weeks). The combination of prednisone, lialda and rowasa enemas and she feels that the prednisone helped her retain the enemas.  In 11/2017 pt had some intermittent bleeding and patient asked to decrease lialda 2.4 g/d and rowasa enemas qhs.  Flex sig 5/11/18 significant for right at the anal verge in the most distal 1-2 cm faint granularity with mild loss of vascularity c/w Jenkins 1 disease. From 0538-6657 she was on intermittent courses rowasa enemas for 2 weeks and this would help when able to hold it in. By 10/2018 she reported if she stopped or skipped dose of rowasa enemas her symptoms would return and she continued on lialda 2.4 g/d and she was advised at this visit to start canasa suppositories qhs, decrease rowasa enemas to once a week, continue lialda 2.4 g/d. Due to ongoing symptoms she started Humira 12/2018 with good response and pt wanted to stop lialda by early 2019. In 4/2020 due to dyspepsia  she was placed on PPI and underwent US 10/2020 for RUQ abd pain which was normal with normal GB EF. She then continued on monotherapy with humira 40 mg SC q 2 weeks.  In approximately 11/2021 pt developed rectal bleeding and started on prednisone course.  On 12/3/21 pt had trough humira levels 15.6/Abs neg. Flex sig 1/31/22 showed circumferential erythema, grnularrity with loss of vascularity and focal punctate ulcers starting 10 cm from anus with biopsies of rectum c/w CMV neg moderately active proctitis and rectosigmoid colon bx normal. In 11/2021 she restarted lialda 4.8 g/d (good adherent). Due to continued symptoms Dr. Ott attempted to get weekly humira approved though this was denied by her insurance so on 3/22/22 she started on stelara with prednisone taper (stopped in 5/2022). Currently patient reports about 40 trips to the toilet of which 35 of those trips (2 trips per hour) are during the day and 5 trips nocturnal b/w hrs of 9pm-5am. Of the total trips to the toilet about 50% are soft to skinny formed stool and remaining are false trips with mucus and blood on toilet paper. Pt has cough, back pain, anxiety/depression. I met her in the IBD clinic on 8/16/22 at which time she was on stelara 90 mg SC q 8 weeks, lialda 4.8 g/d, canasa supp twice a week (urgency and so difficult to take supp) so we proceeded with stool studies to r/o infection and stool calprotectin and advised her to take off of work for 2 weeks and take a course of canasa supp BID. Prior to starting canasa BID she had diarrhea and stool studies neg for infection and suprisingly her stool calprotectin was normal. She took a course of canasa BID from 8/23/22-9/30/22 but then had worsening diarrhea in early 9/2022 and repeat stool calprotectin 9/2022 273.  Meanwhile she continued on stelara every 8 weeks and lialda 4.8 g/d and started uceris foam BID 10/5/22 but not compliant and was only taking 1-2 times/week. She continued on stelara and was  optimized to q 6 weeks starting 11/2/22 due to trough stelara levels 11/2022 of 3.5/Abs neg and fecal calprotectin normal 11/11/22. Previously in April patient had some insurance issues and her stelara was delayed ? weeks/days. On 1/11/23 she stopped tacrolimus suppositories and continued canasa qhs and on 2/27/23 fecal calprotectin normal so on 3/6/23 changed canasa supp qod and was supposed to continue but discontinued on her own 3/25/23. On 3/7/23 trough stelara 7.6/Abs neg (on stelara q 6 weeks) and due to not ever being better on stelara and being in remission for her ulcerative proctitis on topical therapy we proceeded with discontinuation of stelara 4/26/23.  In 6/2023 she met with dietician regarding SCD/anti-inflammatory diet and her stool calprotectin 6/2/23 normal. Early 11/2023 2 soft black/dark red stool and notified us on 11/18/23 and proceeded with stool calprotectin which was normal and we had recommended canasa but she only took for 2 days and stopped due to bloating/gas and we agreed no need to take when stool calprotectin was normal. Stool calprotectin 3/8/24 normal.     Interval History:  - current IBD meds: lialda 4.8 g/d   - diet- mediterranean diet  - 1 formed BMs/d  - stool calprotectin: 1/15/25 normal (41)  - no panic attacks, depression improved- moved out/separation, filed for divorce  - mouth sores- resolved, has not needed to take lysine  - cough- clearing throat aminta lying down at night- whole family has this    Past Medical History[1]    Prior Pertinent Surgeries:   None    Last pertinent Endoscopy/Imaging:   10/2020 US: normal with normal GB EF.  9/29/22 colonoscopy: The perianal and digital rectal examinations were normal. From anal verge to 20 cm mild to moderately active nflammation characterized by diffuse erythema, few superficial and linear ulcerations. From 20-25 cm there is mild erythema and linear ulcerations. From 25 cm to the cecum the mucosa is normal with good preservation  "of vasculature. Normal ICV. Biopsies TI normal. Biopsies of cecum/ascending with patchy minimal architectural distortion, transverse colon normal, descending with patchy minimal increase in lamina propria eosinophils, sigmoid normal, rectosigmoid with chronic active colitis with mild cryptitis, rectum with chronic active colitis, severe with ulceration/granulation tissue formation, CMV neg    Therapeutic Drug Monitoring Labs:  NA    Vaccinations:  Flu shot: recommended yearly   COVID vaccine/booster:  per CDC recommendations  RSV:  after age 61 yo  Tetanus (Tdap):  reminded to get this done  HPV:    NA  Meningococcal: NA  Hepatitis A:  hep A #2 was due 9/2023 but did not get so will check HAV IGG to see if she mounted a response and if not then restart vaccine series  Shingrix: recommended after age 49 yo     Review of Systems: see pertinent review of systems above    Medications Ordered Prior to Encounter[2]    Physical Examination  Ht 5' 1" (1.549 m)   Wt 52.6 kg (116 lb)   BMI 21.92 kg/m²      Labs:   Lab Results   Component Value Date    CRP 2.4 08/16/2022    CALPROTECTIN <27.1 08/18/2022     Lab Results   Component Value Date    HEPBSAG Negative 08/16/2022    HEPBCAB Negative 08/16/2022   ,   Lab Results   Component Value Date    TBGOLDPLUS Negative 08/16/2022     Lab Results   Component Value Date    [id number] 42.2 01/15/2025    [id number] 545 01/15/2025     Lab Results   Component Value Date    WBC 5.1 03/06/2024    HGB 12.8 03/06/2024    HCT 39.5 03/06/2024    MCV 94 03/06/2024     03/06/2024     Lab Results   Component Value Date    CREATININE 0.96 03/06/2024    ALBUMIN 4.5 03/06/2024    BILITOT 0.2 03/06/2024    ALKPHOS 65 03/07/2023    AST 25 03/06/2024    ALT 15 03/06/2024     Assessment/Plan:  Lucía Jewell is a 40 y.o. female with ulcerative colitis (proctitis).     Patient is in clinical, biochemical, endoscopic remission on lialda 4.8 g/d. She has not needed canasa supp since 2023.  "     # Ulcerative colitis (proctitis):  - IBS- symptoms inactive  - continue lialda 4.8 g/d  - continue mediterranean diet   - stool calprotectin now-[facility]  - colonoscopy fall 2025- miralax/gatorade  - drug monitoring labs: CBC/CMP yearly (due now), TPMT (normal 8/2022), TB quantiferon (neg 8/2022), Hep B testing (HBsAg, HBtotalcoreAb neg 8/2022)     # IBD specific health maintenance:  CRC risk: average risk, proctitis  Skin exam yearly - doing self skin exams, has not seen dermatology and reminded to make appt when able locally  Risk for osteopenia/osteoporosis- none  Pap smear- per GYN, UTD   Vitamin D- normal, no RFs, not on supplementation  Lab Results   Component Value Date    [id number] 42.2 01/15/2025   Vaccines- advised patient about seasonal vaccines including flu, covid booster, HAV IgG and if not immune will restart series since she never got 2nd dose, tetanus now locally    Visit today is associated with current or anticipated ongoing medical care related to this patient's single serious condition/complex condition ulcerative colitis.    Follow up in about 1 year (around 7/9/2026) for Video.    The patient location is: Home  The chief complaint leading to consultation is: ulcerative colitis     Visit type: audiovisual    Face to Face time with patient: 19 minutes  34 minutes of total time spent on the encounter, which includes face to face time and non-face to face time preparing to see the patient (eg, review of tests), Obtaining and/or reviewing separately obtained history, Documenting clinical information in the electronic or other health record, Independently interpreting results (not separately reported) and communicating results to the patient/family/caregiver, or Care coordination (not separately reported).     Each patient to whom he or she provides medical services by telemedicine is:  (1) informed of the relationship between the physician and patient and the respective role of any other  health care provider with respect to management of the patient; and (2) notified that he or she may decline to receive medical services by telemedicine and may withdraw from such care at any time.    Zana Saldana MD, FACG, [facility]  Department of Gastroenterology  Medical Director, Inflammatory Bowel Disease             [1]   Past Medical History:  Diagnosis Date    Bipolar disorder with manic depression     Irritable bowel syndrome     Ulcerative proctitis    [2]   Current Outpatient Medications on File Prior to Visit   Medication Sig Dispense Refill    buPROPion (WELLBUTRIN XL) 300 MG 24 hr tablet Take 1 tablet (300 mg total) by mouth once daily. 30 tablet 5    famotidine (PEPCID) 20 MG tablet Take 20 mg by mouth 2 (two) times daily.      lamoTRIgine (LAMICTAL) 150 MG Tab Take 150 mg by mouth once daily.      lysine 1,000 mg Tab Take 1 tablet by mouth once daily.      mesalamine (LIALDA) 1.2 gram TbEC Take 4 tablets (4.8 g total) by mouth daily with breakfast. 120 tablet 7    UNABLE TO FIND Take 1 tablet by mouth once daily. medication name: Essential Skin Food- vit C, biotin      VIORELE, 28, 0.15-0.02 mgx21 /0.01 mg x 5 per tablet TAKE 1 TABLET BY MOUTH EVERY DAY 84 tablet 0     No current facility-administered medications on file prior to visit.

## 2025-08-02 DIAGNOSIS — K51.218 ULCERATIVE PROCTITIS WITH OTHER COMPLICATION: ICD-10-CM

## 2025-08-04 RX ORDER — MESALAMINE 1.2 G/1
4.8 TABLET, DELAYED RELEASE ORAL
Qty: 120 TABLET | Refills: 11 | Status: SHIPPED | OUTPATIENT
Start: 2025-08-04

## 2025-08-04 NOTE — TELEPHONE ENCOUNTER
"Primary provider: Dr. Zana Saldana    IBD medications: lialda 4.8 g/d     Refill request for:      Pended Medication(s)   Requested Prescriptions     Pending Prescriptions Disp Refills    mesalamine (LIALDA) 1.2 gram TbEC 120 tablet 7     Sig: Take 4 tablets (4.8 g total) by mouth daily with breakfast.           Allergies reviewed: Yes    Drug Monitoring labs/frequency for all IBD meds:    CBC and CMP every 12 months    Lab Results   Component Value Date    HEPBSAG Negative 08/16/2022    HEPBCAB Negative 08/16/2022     Lab Results   Component Value Date    TBGOLDPLUS Negative 08/16/2022     No results found for: "QUANTNILVALU", "QUANTIFERON", "QUANTTBGDPL"   No results found for: "TSPOTSCREN"  Lab Results   Component Value Date    [id number] 42.2 01/15/2025    [id number] 545 01/15/2025     Lab Results   Component Value Date    WBC 6.0 07/16/2025    HGB 12.5 07/16/2025    HCT 39.3 07/16/2025    MCV 98 (H) 07/16/2025     07/16/2025     Lab Results   Component Value Date    CREATININE 1.00 07/16/2025    ALBUMIN 3.9 07/16/2025    BILITOT 0.2 07/16/2025    ALKPHOS 65 03/07/2023    AST 17 07/16/2025    ALT 12 07/16/2025     Lab Results   Component Value Date    CHOL 218 (H) 12/31/2024    HDL 75 (H) 12/31/2024    LDLCALC 122 (H) 12/31/2024    TRIG 104 12/31/2024       Lab due date (mo/yr):  7/2026    Labs scheduled: No - but patient has an OV before or when labs are due     Next appt: 7/8/2026    RX refill sent to provider for amount until next labs: Yes       "

## 2025-08-13 ENCOUNTER — TELEPHONE (OUTPATIENT)
Dept: GASTROENTEROLOGY | Facility: CLINIC | Age: 40
End: 2025-08-13
Payer: COMMERCIAL

## 2025-08-15 ENCOUNTER — TELEPHONE (OUTPATIENT)
Dept: GASTROENTEROLOGY | Facility: CLINIC | Age: 40
End: 2025-08-15
Payer: COMMERCIAL

## 2025-08-26 ENCOUNTER — TELEPHONE (OUTPATIENT)
Dept: ENDOSCOPY | Facility: HOSPITAL | Age: 40
End: 2025-08-26
Payer: COMMERCIAL